# Patient Record
Sex: FEMALE | Race: WHITE | NOT HISPANIC OR LATINO | Employment: FULL TIME | URBAN - NONMETROPOLITAN AREA
[De-identification: names, ages, dates, MRNs, and addresses within clinical notes are randomized per-mention and may not be internally consistent; named-entity substitution may affect disease eponyms.]

---

## 2017-10-17 ENCOUNTER — OFFICE VISIT (OUTPATIENT)
Dept: FAMILY MEDICINE CLINIC | Facility: CLINIC | Age: 62
End: 2017-10-17

## 2017-10-17 ENCOUNTER — LAB (OUTPATIENT)
Dept: LAB | Facility: OTHER | Age: 62
End: 2017-10-17

## 2017-10-17 VITALS
DIASTOLIC BLOOD PRESSURE: 70 MMHG | HEIGHT: 60 IN | TEMPERATURE: 97.7 F | HEART RATE: 82 BPM | WEIGHT: 155.6 LBS | BODY MASS INDEX: 30.55 KG/M2 | SYSTOLIC BLOOD PRESSURE: 120 MMHG

## 2017-10-17 DIAGNOSIS — R42 DIZZY: ICD-10-CM

## 2017-10-17 DIAGNOSIS — R42 DIZZY: Primary | ICD-10-CM

## 2017-10-17 DIAGNOSIS — H65.03 BILATERAL ACUTE SEROUS OTITIS MEDIA, RECURRENCE NOT SPECIFIED: ICD-10-CM

## 2017-10-17 LAB
ALBUMIN SERPL-MCNC: 4.2 G/DL (ref 3.2–5.5)
ALBUMIN/GLOB SERPL: 1.4 G/DL (ref 1–3)
ALP SERPL-CCNC: 72 U/L (ref 15–121)
ALT SERPL W P-5'-P-CCNC: 15 U/L (ref 10–60)
ANION GAP SERPL CALCULATED.3IONS-SCNC: 10 MMOL/L (ref 5–15)
AST SERPL-CCNC: 16 U/L (ref 10–60)
BASOPHILS # BLD AUTO: 0.03 10*3/MM3 (ref 0–0.2)
BASOPHILS NFR BLD AUTO: 0.6 % (ref 0–2)
BILIRUB SERPL-MCNC: 0.8 MG/DL (ref 0.2–1)
BUN BLD-MCNC: 12 MG/DL (ref 8–25)
BUN/CREAT SERPL: 17.1 (ref 7–25)
CALCIUM SPEC-SCNC: 9.6 MG/DL (ref 8.4–10.8)
CHLORIDE SERPL-SCNC: 99 MMOL/L (ref 100–112)
CO2 SERPL-SCNC: 29 MMOL/L (ref 20–32)
CREAT BLD-MCNC: 0.7 MG/DL (ref 0.4–1.3)
DEPRECATED RDW RBC AUTO: 48.1 FL (ref 36.4–46.3)
EOSINOPHIL # BLD AUTO: 0.11 10*3/MM3 (ref 0–0.7)
EOSINOPHIL NFR BLD AUTO: 2.3 % (ref 0–7)
ERYTHROCYTE [DISTWIDTH] IN BLOOD BY AUTOMATED COUNT: 14.3 % (ref 11.5–14.5)
GFR SERPL CREATININE-BSD FRML MDRD: 86 ML/MIN/1.73 (ref 51–120)
GLOBULIN UR ELPH-MCNC: 3.1 GM/DL (ref 2.5–4.6)
GLUCOSE BLD-MCNC: 87 MG/DL (ref 70–100)
HCT VFR BLD AUTO: 41.4 % (ref 35–45)
HGB BLD-MCNC: 13.2 G/DL (ref 12–15.5)
LYMPHOCYTES # BLD AUTO: 1.18 10*3/MM3 (ref 0.6–4.2)
LYMPHOCYTES NFR BLD AUTO: 24.8 % (ref 10–50)
MCH RBC QN AUTO: 30.5 PG (ref 26.5–34)
MCHC RBC AUTO-ENTMCNC: 31.9 G/DL (ref 31.4–36)
MCV RBC AUTO: 95.6 FL (ref 80–98)
MONOCYTES # BLD AUTO: 0.44 10*3/MM3 (ref 0–0.9)
MONOCYTES NFR BLD AUTO: 9.3 % (ref 0–12)
NEUTROPHILS # BLD AUTO: 2.99 10*3/MM3 (ref 2–8.6)
NEUTROPHILS NFR BLD AUTO: 63 % (ref 37–80)
PLATELET # BLD AUTO: 181 10*3/MM3 (ref 150–450)
PMV BLD AUTO: 9.8 FL (ref 8–12)
POTASSIUM BLD-SCNC: 4.3 MMOL/L (ref 3.4–5.4)
PROT SERPL-MCNC: 7.3 G/DL (ref 6.7–8.2)
RBC # BLD AUTO: 4.33 10*6/MM3 (ref 3.77–5.16)
SODIUM BLD-SCNC: 138 MMOL/L (ref 134–146)
WBC NRBC COR # BLD: 4.75 10*3/MM3 (ref 3.2–9.8)

## 2017-10-17 PROCEDURE — 85025 COMPLETE CBC W/AUTO DIFF WBC: CPT | Performed by: NURSE PRACTITIONER

## 2017-10-17 PROCEDURE — 99214 OFFICE O/P EST MOD 30 MIN: CPT | Performed by: NURSE PRACTITIONER

## 2017-10-17 PROCEDURE — 82746 ASSAY OF FOLIC ACID SERUM: CPT | Performed by: NURSE PRACTITIONER

## 2017-10-17 PROCEDURE — 83540 ASSAY OF IRON: CPT | Performed by: NURSE PRACTITIONER

## 2017-10-17 PROCEDURE — 84443 ASSAY THYROID STIM HORMONE: CPT | Performed by: NURSE PRACTITIONER

## 2017-10-17 PROCEDURE — 82728 ASSAY OF FERRITIN: CPT | Performed by: NURSE PRACTITIONER

## 2017-10-17 PROCEDURE — 36415 COLL VENOUS BLD VENIPUNCTURE: CPT | Performed by: NURSE PRACTITIONER

## 2017-10-17 PROCEDURE — 82607 VITAMIN B-12: CPT | Performed by: NURSE PRACTITIONER

## 2017-10-17 PROCEDURE — 83550 IRON BINDING TEST: CPT | Performed by: NURSE PRACTITIONER

## 2017-10-17 PROCEDURE — 80053 COMPREHEN METABOLIC PANEL: CPT | Performed by: NURSE PRACTITIONER

## 2017-10-17 NOTE — PROGRESS NOTES
Subjective   Amanda Pittman is a 61 y.o. female. Patient here today with complaints of Balance Issues  Patient here today with  complaining of balance issues.  She reports being off balanced at times and really has no other complaints, no chest pain no shortness of breath no headache no sinus complaints.  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  This was performed in [place] by neurosurgeon approximately 2-3 years ago.  She denies having had any issues or problems since then, denies headache.    Vitals:    10/17/17 0843   BP: 120/70   Pulse: 82   Temp: 97.7 °F (36.5 °C)     Past Medical History:   Diagnosis Date   • Acute bronchitis    • Cellulitis     and abscess of face   • Dysphasia    • Herpes zoster    • Memory impairment      Dizziness   This is a new problem. The current episode started 1 to 4 weeks ago. The problem occurs constantly. The problem has been waxing and waning. Associated symptoms include vertigo. Pertinent negatives include no abdominal pain, arthralgias, change in bowel habit, chest pain, chills, congestion, coughing, diaphoresis, fever, headaches, joint swelling, myalgias, nausea, neck pain, numbness, rash, sore throat, swollen glands, urinary symptoms, vomiting or weakness. Nothing aggravates the symptoms. She has tried nothing for the symptoms. The treatment provided no relief.        The following portions of the patient's history were reviewed and updated as appropriate: allergies, current medications, past family history, past medical history, past social history, past surgical history and problem list.    Review of Systems   Constitutional: Negative.  Negative for chills, diaphoresis and fever.   HENT: Negative.  Negative for congestion and sore throat.    Eyes: Negative.    Respiratory: Negative.  Negative for cough.    Cardiovascular: Negative.  Negative for chest pain.   Gastrointestinal: Negative.  Negative for abdominal pain, change in bowel habit,  nausea and vomiting.   Endocrine: Negative.    Genitourinary: Negative.    Musculoskeletal: Negative.  Negative for arthralgias, joint swelling, myalgias and neck pain.   Skin: Negative.  Negative for rash.   Allergic/Immunologic: Negative.    Neurological: Positive for dizziness, vertigo and light-headedness. Negative for weakness, numbness and headaches.   Hematological: Negative.    Psychiatric/Behavioral: Negative.        Objective   Physical Exam   Constitutional: She is oriented to person, place, and time. She appears well-developed and well-nourished. No distress.   HENT:   Head: Normocephalic and atraumatic.   Right Ear: Hearing, external ear and ear canal normal. Tympanic membrane is bulging. A middle ear effusion is present.   Left Ear: Hearing, external ear and ear canal normal. Tympanic membrane is bulging. A middle ear effusion is present.   Nose: Right sinus exhibits no maxillary sinus tenderness and no frontal sinus tenderness. Left sinus exhibits no maxillary sinus tenderness and no frontal sinus tenderness.   Mouth/Throat: Uvula is midline, oropharynx is clear and moist and mucous membranes are normal. No tonsillar exudate.   Neck: Normal carotid pulses present. Carotid bruit is not present.   Cardiovascular: Normal rate, regular rhythm and normal heart sounds.  Exam reveals no gallop and no friction rub.    No murmur heard.  Pulmonary/Chest: Effort normal and breath sounds normal. No respiratory distress. She has no wheezes. She has no rales.   Musculoskeletal: She exhibits no edema.   Neurological: She is alert and oriented to person, place, and time. She has normal strength and normal reflexes. She displays normal reflexes. No cranial nerve deficit. She exhibits normal muscle tone. Coordination (positive Romberg, she is unable to walk heel to toe in exam room) abnormal.   Skin: Skin is warm and dry. No rash noted. She is not diaphoretic. No erythema. No pallor.   Psychiatric: She has a normal  mood and affect. Her speech is normal and behavior is normal. Judgment and thought content normal. She is not actively hallucinating. Cognition and memory are normal. She is attentive.   Nursing note and vitals reviewed.      Assessment/Plan   Amanda was seen today for balance issues.    Diagnoses and all orders for this visit:    Dizzy  -     CBC & Differential; Future  -     Comprehensive Metabolic Panel; Future  -     TSH; Future  -     CT Head With & Without Contrast  -     Vitamin B12; Future  -     Ferritin; Future  -     Folate; Future  -     Iron and TIBC; Future    Bilateral acute serous otitis media, recurrence not specified    Other orders  -     fluticasone (FLONASE) 50 MCG/ACT nasal spray; 2 sprays into each nostril Daily.  -     meclizine (ANTIVERT) 25 MG tablet; Take 1 tablet by mouth 3 (Three) Times a Day As Needed for dizziness.  -     predniSONE (DELTASONE) 20 MG tablet; Take 1 tablet by mouth 2 (Two) Times a Day for 5 days.    I will obtain above labs and order CT of head with and without contrast.  MRI cannot be obtained due to shunt.  She is informed of results and I will treat her with Flonase meclizine as above when necessary and prednisone as above.  However, they are advised if her symptoms should persist or worsen she needs to return here for follow-up and may need referral on to neurology if symptoms persist.  If symptoms resolve completely then she will follow-up here as scheduled for chronic conditions.  All questions and concerns are addressed with understanding noted.  She is aware and is in agreement to above plan.

## 2017-10-18 ENCOUNTER — TELEPHONE (OUTPATIENT)
Dept: FAMILY MEDICINE CLINIC | Facility: CLINIC | Age: 62
End: 2017-10-18

## 2017-10-18 DIAGNOSIS — J34.89 LESION OF NOSE: Primary | ICD-10-CM

## 2017-10-18 LAB
FERRITIN SERPL-MCNC: 53.1 NG/ML (ref 11.1–264)
FOLATE SERPL-MCNC: 19.3 NG/ML (ref 2.76–21)
IRON 24H UR-MRATE: 66 MCG/DL (ref 37–170)
IRON SATN MFR SERPL: 21 % (ref 15–50)
TIBC SERPL-MCNC: 310 MCG/DL (ref 265–497)
TSH SERPL DL<=0.05 MIU/L-ACNC: 0.98 MIU/ML (ref 0.46–4.68)
VIT B12 BLD-MCNC: 246 PG/ML (ref 239–931)

## 2017-10-18 RX ORDER — MECLIZINE HYDROCHLORIDE 25 MG/1
25 TABLET ORAL 3 TIMES DAILY PRN
Qty: 30 TABLET | Refills: 0 | Status: SHIPPED | OUTPATIENT
Start: 2017-10-18 | End: 2018-09-04

## 2017-10-18 RX ORDER — FLUTICASONE PROPIONATE 50 MCG
2 SPRAY, SUSPENSION (ML) NASAL DAILY
Qty: 1 BOTTLE | Refills: 0 | Status: SHIPPED | OUTPATIENT
Start: 2017-10-18 | End: 2018-08-20

## 2017-10-18 RX ORDER — PREDNISONE 20 MG/1
20 TABLET ORAL 2 TIMES DAILY
Qty: 10 TABLET | Refills: 0 | Status: SHIPPED | OUTPATIENT
Start: 2017-10-18 | End: 2017-10-23

## 2017-10-23 ENCOUNTER — CLINICAL SUPPORT (OUTPATIENT)
Dept: FAMILY MEDICINE CLINIC | Facility: CLINIC | Age: 62
End: 2017-10-23

## 2017-10-23 DIAGNOSIS — E53.8 B12 DEFICIENCY: Primary | ICD-10-CM

## 2017-10-23 PROCEDURE — 96372 THER/PROPH/DIAG INJ SC/IM: CPT | Performed by: NURSE PRACTITIONER

## 2017-10-30 ENCOUNTER — CLINICAL SUPPORT (OUTPATIENT)
Dept: FAMILY MEDICINE CLINIC | Facility: CLINIC | Age: 62
End: 2017-10-30

## 2017-10-30 DIAGNOSIS — E53.8 B12 DEFICIENCY: Primary | ICD-10-CM

## 2017-10-30 PROCEDURE — 96372 THER/PROPH/DIAG INJ SC/IM: CPT | Performed by: NURSE PRACTITIONER

## 2017-11-02 RX ORDER — CYANOCOBALAMIN 1000 UG/ML
1000 INJECTION, SOLUTION INTRAMUSCULAR; SUBCUTANEOUS WEEKLY
Status: SHIPPED | OUTPATIENT
Start: 2017-11-02 | End: 2017-11-30

## 2017-11-02 RX ADMIN — CYANOCOBALAMIN 1000 MCG: 1000 INJECTION, SOLUTION INTRAMUSCULAR; SUBCUTANEOUS at 10:10

## 2017-11-07 ENCOUNTER — CLINICAL SUPPORT (OUTPATIENT)
Dept: FAMILY MEDICINE CLINIC | Facility: CLINIC | Age: 62
End: 2017-11-07

## 2017-11-07 DIAGNOSIS — E53.8 B12 DEFICIENCY: Primary | ICD-10-CM

## 2017-11-07 PROCEDURE — 96372 THER/PROPH/DIAG INJ SC/IM: CPT | Performed by: NURSE PRACTITIONER

## 2017-11-13 RX ADMIN — CYANOCOBALAMIN 1000 MCG: 1000 INJECTION, SOLUTION INTRAMUSCULAR; SUBCUTANEOUS at 12:14

## 2017-11-14 ENCOUNTER — CLINICAL SUPPORT (OUTPATIENT)
Dept: FAMILY MEDICINE CLINIC | Facility: CLINIC | Age: 62
End: 2017-11-14

## 2017-11-14 DIAGNOSIS — E53.8 B12 DEFICIENCY: Primary | ICD-10-CM

## 2017-11-14 PROCEDURE — 96372 THER/PROPH/DIAG INJ SC/IM: CPT | Performed by: NURSE PRACTITIONER

## 2017-11-16 RX ADMIN — CYANOCOBALAMIN 1000 MCG: 1000 INJECTION, SOLUTION INTRAMUSCULAR; SUBCUTANEOUS at 10:24

## 2017-12-04 RX ORDER — CYANOCOBALAMIN 1000 UG/ML
1000 INJECTION, SOLUTION INTRAMUSCULAR; SUBCUTANEOUS
Status: DISCONTINUED | OUTPATIENT
Start: 2017-12-04 | End: 2018-08-20 | Stop reason: DRUGHIGH

## 2017-12-04 RX ADMIN — CYANOCOBALAMIN 1000 MCG: 1000 INJECTION, SOLUTION INTRAMUSCULAR; SUBCUTANEOUS at 10:51

## 2017-12-12 ENCOUNTER — CLINICAL SUPPORT (OUTPATIENT)
Dept: FAMILY MEDICINE CLINIC | Facility: CLINIC | Age: 62
End: 2017-12-12

## 2017-12-12 DIAGNOSIS — E53.8 B12 DEFICIENCY: Primary | ICD-10-CM

## 2017-12-12 PROCEDURE — 96372 THER/PROPH/DIAG INJ SC/IM: CPT | Performed by: NURSE PRACTITIONER

## 2018-01-02 RX ADMIN — CYANOCOBALAMIN 1000 MCG: 1000 INJECTION, SOLUTION INTRAMUSCULAR; SUBCUTANEOUS at 15:51

## 2018-01-23 ENCOUNTER — TRANSCRIBE ORDERS (OUTPATIENT)
Dept: LAB | Facility: OTHER | Age: 63
End: 2018-01-23

## 2018-01-23 ENCOUNTER — LAB (OUTPATIENT)
Dept: LAB | Facility: OTHER | Age: 63
End: 2018-01-23

## 2018-01-23 ENCOUNTER — CLINICAL SUPPORT (OUTPATIENT)
Dept: FAMILY MEDICINE CLINIC | Facility: CLINIC | Age: 63
End: 2018-01-23

## 2018-01-23 DIAGNOSIS — C01 PRIMARY SQUAMOUS CELL CARCINOMA OF BASE OF TONGUE (HCC): ICD-10-CM

## 2018-01-23 DIAGNOSIS — E53.8 B12 DEFICIENCY: Primary | ICD-10-CM

## 2018-01-23 DIAGNOSIS — C01 PRIMARY SQUAMOUS CELL CARCINOMA OF BASE OF TONGUE (HCC): Primary | ICD-10-CM

## 2018-01-23 LAB
APTT PPP: 34.3 SECONDS (ref 20–40.3)
DEPRECATED RDW RBC AUTO: 45.7 FL (ref 36.4–46.3)
EOSINOPHIL # BLD MANUAL: 0.1 10*3/MM3 (ref 0–0.7)
EOSINOPHIL NFR BLD MANUAL: 2 % (ref 0–7)
ERYTHROCYTE [DISTWIDTH] IN BLOOD BY AUTOMATED COUNT: 13.8 % (ref 11.5–14.5)
HCT VFR BLD AUTO: 37.6 % (ref 35–45)
HGB BLD-MCNC: 11.9 G/DL (ref 12–15.5)
INR PPP: 0.92 (ref 0.8–1.2)
LYMPHOCYTES # BLD MANUAL: 1.53 10*3/MM3 (ref 0.6–4.2)
LYMPHOCYTES NFR BLD MANUAL: 30 % (ref 10–50)
LYMPHOCYTES NFR BLD MANUAL: 8 % (ref 0–12)
MCH RBC QN AUTO: 29.5 PG (ref 26.5–34)
MCHC RBC AUTO-ENTMCNC: 31.6 G/DL (ref 31.4–36)
MCV RBC AUTO: 93.1 FL (ref 80–98)
MONOCYTES # BLD AUTO: 0.41 10*3/MM3 (ref 0–0.9)
NEUTROPHILS # BLD AUTO: 3.05 10*3/MM3 (ref 2–8.6)
NEUTROPHILS NFR BLD MANUAL: 59 % (ref 37–80)
NEUTS BAND NFR BLD MANUAL: 1 % (ref 0–5)
PLATELET # BLD AUTO: 284 10*3/MM3 (ref 150–450)
PMV BLD AUTO: 9 FL (ref 8–12)
PROTHROMBIN TIME: 12.2 SECONDS (ref 11.1–15.3)
RBC # BLD AUTO: 4.04 10*6/MM3 (ref 3.77–5.16)
RBC MORPH BLD: NORMAL
SMALL PLATELETS BLD QL SMEAR: ADEQUATE
WBC MORPH BLD: NORMAL
WBC NRBC COR # BLD: 5.09 10*3/MM3 (ref 3.2–9.8)

## 2018-01-23 PROCEDURE — 85730 THROMBOPLASTIN TIME PARTIAL: CPT | Performed by: OTOLARYNGOLOGY

## 2018-01-23 PROCEDURE — 85025 COMPLETE CBC W/AUTO DIFF WBC: CPT | Performed by: INTERNAL MEDICINE

## 2018-01-23 PROCEDURE — 36415 COLL VENOUS BLD VENIPUNCTURE: CPT | Performed by: INTERNAL MEDICINE

## 2018-01-23 PROCEDURE — 96372 THER/PROPH/DIAG INJ SC/IM: CPT | Performed by: NURSE PRACTITIONER

## 2018-01-23 PROCEDURE — 85610 PROTHROMBIN TIME: CPT | Performed by: INTERNAL MEDICINE

## 2018-01-31 RX ADMIN — CYANOCOBALAMIN 1000 MCG: 1000 INJECTION, SOLUTION INTRAMUSCULAR; SUBCUTANEOUS at 09:34

## 2018-08-20 ENCOUNTER — LAB (OUTPATIENT)
Dept: LAB | Facility: OTHER | Age: 63
End: 2018-08-20

## 2018-08-20 ENCOUNTER — OFFICE VISIT (OUTPATIENT)
Dept: FAMILY MEDICINE CLINIC | Facility: CLINIC | Age: 63
End: 2018-08-20

## 2018-08-20 VITALS
BODY MASS INDEX: 28.86 KG/M2 | TEMPERATURE: 97.6 F | DIASTOLIC BLOOD PRESSURE: 60 MMHG | HEART RATE: 62 BPM | HEIGHT: 60 IN | WEIGHT: 147 LBS | SYSTOLIC BLOOD PRESSURE: 112 MMHG

## 2018-08-20 DIAGNOSIS — R11.2 NON-INTRACTABLE VOMITING WITH NAUSEA, UNSPECIFIED VOMITING TYPE: ICD-10-CM

## 2018-08-20 DIAGNOSIS — R63.4 WEIGHT LOSS: ICD-10-CM

## 2018-08-20 DIAGNOSIS — R26.89 BALANCE PROBLEM: Primary | ICD-10-CM

## 2018-08-20 DIAGNOSIS — Z98.2 HISTORY OF BRAIN SHUNT: ICD-10-CM

## 2018-08-20 DIAGNOSIS — R53.83 FATIGUE, UNSPECIFIED TYPE: ICD-10-CM

## 2018-08-20 DIAGNOSIS — R26.89 BALANCE PROBLEM: ICD-10-CM

## 2018-08-20 LAB
ALBUMIN SERPL-MCNC: 4.5 G/DL (ref 3.5–5)
ALBUMIN/GLOB SERPL: 1.5 G/DL (ref 1.1–1.8)
ALP SERPL-CCNC: 88 U/L (ref 38–126)
ALT SERPL W P-5'-P-CCNC: 22 U/L
ANION GAP SERPL CALCULATED.3IONS-SCNC: 8 MMOL/L (ref 5–15)
AST SERPL-CCNC: 27 U/L (ref 14–36)
BASOPHILS # BLD AUTO: 0.01 10*3/MM3 (ref 0–0.2)
BASOPHILS NFR BLD AUTO: 0.2 % (ref 0–2)
BILIRUB SERPL-MCNC: 0.7 MG/DL (ref 0.2–1.3)
BUN BLD-MCNC: 12 MG/DL (ref 7–17)
BUN/CREAT SERPL: 18.8 (ref 7–25)
CALCIUM SPEC-SCNC: 9.8 MG/DL (ref 8.4–10.2)
CHLORIDE SERPL-SCNC: 99 MMOL/L (ref 98–107)
CO2 SERPL-SCNC: 28 MMOL/L (ref 22–30)
CREAT BLD-MCNC: 0.64 MG/DL (ref 0.52–1.04)
DEPRECATED RDW RBC AUTO: 48.8 FL (ref 36.4–46.3)
EOSINOPHIL # BLD AUTO: 0.09 10*3/MM3 (ref 0–0.7)
EOSINOPHIL NFR BLD AUTO: 2.2 % (ref 0–7)
ERYTHROCYTE [DISTWIDTH] IN BLOOD BY AUTOMATED COUNT: 14.9 % (ref 11.5–14.5)
FERRITIN SERPL-MCNC: 96.3 NG/ML (ref 11.1–264)
FOLATE SERPL-MCNC: 14.7 NG/ML (ref 2.76–21)
GFR SERPL CREATININE-BSD FRML MDRD: 95 ML/MIN/1.73 (ref 45–104)
GLOBULIN UR ELPH-MCNC: 3.1 GM/DL (ref 2.3–3.5)
GLUCOSE BLD-MCNC: 115 MG/DL (ref 74–99)
HCT VFR BLD AUTO: 43 % (ref 35–45)
HGB BLD-MCNC: 13.9 G/DL (ref 12–15.5)
IRON 24H UR-MRATE: 57 MCG/DL (ref 37–170)
IRON SATN MFR SERPL: 19 % (ref 15–50)
LYMPHOCYTES # BLD AUTO: 0.78 10*3/MM3 (ref 0.6–4.2)
LYMPHOCYTES NFR BLD AUTO: 19.3 % (ref 10–50)
MCH RBC QN AUTO: 30 PG (ref 26.5–34)
MCHC RBC AUTO-ENTMCNC: 32.3 G/DL (ref 31.4–36)
MCV RBC AUTO: 92.9 FL (ref 80–98)
MONOCYTES # BLD AUTO: 0.36 10*3/MM3 (ref 0–0.9)
MONOCYTES NFR BLD AUTO: 8.9 % (ref 0–12)
NEUTROPHILS # BLD AUTO: 2.8 10*3/MM3 (ref 2–8.6)
NEUTROPHILS NFR BLD AUTO: 69.4 % (ref 37–80)
PLATELET # BLD AUTO: 198 10*3/MM3 (ref 150–450)
PMV BLD AUTO: 10.1 FL (ref 8–12)
POTASSIUM BLD-SCNC: 4.7 MMOL/L (ref 3.4–5)
PROT SERPL-MCNC: 7.6 G/DL (ref 6.3–8.2)
RBC # BLD AUTO: 4.63 10*6/MM3 (ref 3.77–5.16)
SODIUM BLD-SCNC: 135 MMOL/L (ref 137–145)
T4 FREE SERPL-MCNC: 1.17 NG/DL (ref 0.78–2.19)
TIBC SERPL-MCNC: 305 MCG/DL (ref 265–497)
TSH SERPL DL<=0.05 MIU/L-ACNC: 1.01 MIU/ML (ref 0.46–4.68)
VIT B12 BLD-MCNC: 895 PG/ML (ref 239–931)
WBC NRBC COR # BLD: 4.04 10*3/MM3 (ref 3.2–9.8)

## 2018-08-20 PROCEDURE — 83550 IRON BINDING TEST: CPT | Performed by: NURSE PRACTITIONER

## 2018-08-20 PROCEDURE — 80053 COMPREHEN METABOLIC PANEL: CPT | Performed by: NURSE PRACTITIONER

## 2018-08-20 PROCEDURE — 84443 ASSAY THYROID STIM HORMONE: CPT | Performed by: NURSE PRACTITIONER

## 2018-08-20 PROCEDURE — 99214 OFFICE O/P EST MOD 30 MIN: CPT | Performed by: NURSE PRACTITIONER

## 2018-08-20 PROCEDURE — 85025 COMPLETE CBC W/AUTO DIFF WBC: CPT | Performed by: NURSE PRACTITIONER

## 2018-08-20 PROCEDURE — 84439 ASSAY OF FREE THYROXINE: CPT | Performed by: NURSE PRACTITIONER

## 2018-08-20 PROCEDURE — 82746 ASSAY OF FOLIC ACID SERUM: CPT | Performed by: NURSE PRACTITIONER

## 2018-08-20 PROCEDURE — 84481 FREE ASSAY (FT-3): CPT | Performed by: NURSE PRACTITIONER

## 2018-08-20 PROCEDURE — 82607 VITAMIN B-12: CPT | Performed by: NURSE PRACTITIONER

## 2018-08-20 PROCEDURE — 82728 ASSAY OF FERRITIN: CPT | Performed by: NURSE PRACTITIONER

## 2018-08-20 PROCEDURE — 83540 ASSAY OF IRON: CPT | Performed by: NURSE PRACTITIONER

## 2018-08-20 PROCEDURE — 36415 COLL VENOUS BLD VENIPUNCTURE: CPT | Performed by: NURSE PRACTITIONER

## 2018-08-20 NOTE — PROGRESS NOTES
"Subjective   Amanda Pittman is a 62 y.o. female. Patient here today with complaints of Balance Issues  Patient here today with  complaining of not feeling well ×8 months, worsening.  She has reports of feeling off balanced and inability to walk a straight line, inability to walk heel to toe for several months.  Reports has had neurosurgical procedure with shunt placement with Dr. Greer in [place] at the [facility] a few years ago.  At that time she was having memory problems however reports currently her memory is \"good\" does have headaches but states no change in her headaches, does report nausea and vomiting, weight loss, nausea and vomiting worse after she eats, denies abdominal pain.  Reports good vision.  Since last visit she has been seeing regarding skin cancers on her face, optometry has given her new glasses and she has started B12 injections however none of these have changed her symptoms which are worsening.  Also complaining of fatigue.  Works from 4 AM to 1 PM and does nap during the day, reports she has had this scheduled for the last year and denies any increased and fatigue. relates she cannot have MRI due to shunt. Did have CT head at last visit and these results are reviewed today. Negative for acute problems. States is \"cold all of the time\".     Vitals:    08/20/18 1345   BP: 112/60   Pulse: 62   Temp: 97.6 °F (36.4 °C)     Past Medical History:   Diagnosis Date   • Acute bronchitis    • Cellulitis     and abscess of face   • Dysphasia    • Herpes zoster    • Memory impairment      History of Present Illness     The following portions of the patient's history were reviewed and updated as appropriate: allergies, current medications, past family history, past medical history, past social history, past surgical history and problem list.    Review of Systems   Constitutional: Positive for appetite change.   HENT: Negative.    Eyes: Negative.    Respiratory: Negative.  "   Cardiovascular: Negative.    Gastrointestinal: Positive for nausea and vomiting.   Endocrine: Negative.    Genitourinary: Negative.    Musculoskeletal: Positive for gait problem.   Skin: Negative.    Allergic/Immunologic: Negative.    Neurological: Positive for dizziness and weakness.   Hematological: Negative.    Psychiatric/Behavioral: Negative.        Objective   Physical Exam   Constitutional: She is oriented to person, place, and time. She appears well-developed and well-nourished. No distress.   HENT:   Head: Normocephalic and atraumatic.   Right Ear: External ear normal.   Left Ear: External ear normal.   Nose: Nose normal.   Mouth/Throat: Oropharynx is clear and moist.   Neck: Neck supple. Normal carotid pulses present. Carotid bruit is not present.   Cardiovascular: Normal rate, regular rhythm and normal heart sounds.  Exam reveals no gallop and no friction rub.    No murmur heard.  Pulmonary/Chest: Effort normal and breath sounds normal. No respiratory distress. She has no wheezes. She has no rales.   Abdominal: Soft. Bowel sounds are normal. She exhibits no distension and no mass. There is no tenderness. There is no rebound and no guarding. No hernia.   Neurological: She is alert and oriented to person, place, and time. She has normal reflexes. She displays normal reflexes. No cranial nerve deficit or sensory deficit. She exhibits abnormal muscle tone. She displays no seizure activity. Coordination (pos rhomberg, unable to perform heel to toe test, unsteady gait) and gait abnormal.   Skin: Skin is warm and dry. No rash noted. She is not diaphoretic. No erythema. No pallor.   Psychiatric: She has a normal mood and affect. Her behavior is normal.   Nursing note and vitals reviewed.      Assessment/Plan   Amanda was seen today for balance issues.    Diagnoses and all orders for this visit:    Balance problem  -     TSH; Future  -     T3, free; Future  -     T4, free; Future  -     CBC & Differential;  Future  -     Comprehensive metabolic panel; Future  -     Vitamin B12; Future  -     Ferritin; Future  -     Folate; Future  -     Iron and TIBC; Future    Fatigue, unspecified type  -     TSH; Future  -     T3, free; Future  -     T4, free; Future  -     CBC & Differential; Future  -     Comprehensive metabolic panel; Future  -     Vitamin B12; Future  -     Ferritin; Future  -     Folate; Future  -     Iron and TIBC; Future    Weight loss  -     TSH; Future  -     T3, free; Future  -     T4, free; Future  -     CBC & Differential; Future  -     Comprehensive metabolic panel; Future  -     Vitamin B12; Future  -     Ferritin; Future  -     Folate; Future  -     Iron and TIBC; Future    Non-intractable vomiting with nausea, unspecified vomiting type  -     TSH; Future  -     T3, free; Future  -     T4, free; Future  -     CBC & Differential; Future  -     Comprehensive metabolic panel; Future  -     Vitamin B12; Future  -     Ferritin; Future  -     Folate; Future  -     Iron and TIBC; Future    History of brain shunt    referred back to neurosurg dr agrawal for further eval and treatment as he deems nec  Previous CT head results reviewed  Will obtain labs as above and inform of results  Advised against working , climbing, driving until seen and evaluated by neurosurg.   Work excuse can be given if needed  They are aware and are in agreement to this plan.  All questions and concerns are addressed with understanding noted.

## 2018-08-21 ENCOUNTER — TELEPHONE (OUTPATIENT)
Dept: FAMILY MEDICINE CLINIC | Facility: CLINIC | Age: 63
End: 2018-08-21

## 2018-08-21 DIAGNOSIS — R93.89 ABNORMAL MRI: Primary | ICD-10-CM

## 2018-08-21 NOTE — TELEPHONE ENCOUNTER
----- Message from YUMI Doss sent at 8/21/2018 10:23 AM CDT -----  Inform pt, did she get an appointment for neurosurg?

## 2018-08-22 LAB — T3FREE SERPL-MCNC: 2.2 PG/ML (ref 2–4.4)

## 2018-09-04 ENCOUNTER — TELEPHONE (OUTPATIENT)
Dept: FAMILY MEDICINE CLINIC | Facility: CLINIC | Age: 63
End: 2018-09-04

## 2018-09-04 ENCOUNTER — OFFICE VISIT (OUTPATIENT)
Dept: FAMILY MEDICINE CLINIC | Facility: CLINIC | Age: 63
End: 2018-09-04

## 2018-09-04 VITALS
DIASTOLIC BLOOD PRESSURE: 70 MMHG | BODY MASS INDEX: 28.86 KG/M2 | SYSTOLIC BLOOD PRESSURE: 112 MMHG | HEART RATE: 68 BPM | HEIGHT: 60 IN | TEMPERATURE: 97.5 F | OXYGEN SATURATION: 95 % | WEIGHT: 147 LBS

## 2018-09-04 DIAGNOSIS — R26.89 IMBALANCE: Primary | ICD-10-CM

## 2018-09-04 DIAGNOSIS — H92.03 OTALGIA OF BOTH EARS: ICD-10-CM

## 2018-09-04 PROCEDURE — 99213 OFFICE O/P EST LOW 20 MIN: CPT | Performed by: NURSE PRACTITIONER

## 2018-09-04 RX ORDER — MECLIZINE HYDROCHLORIDE 25 MG/1
25 TABLET ORAL 3 TIMES DAILY PRN
Qty: 30 TABLET | Refills: 0 | Status: SHIPPED | OUTPATIENT
Start: 2018-09-04 | End: 2019-01-07

## 2018-09-04 NOTE — PROGRESS NOTES
Subjective   Amanda Pittman is a 62 y.o. female. Patient here today with complaints of Follow-up and Balance Issues  Patient here today with  for recheck of feeling off balanced when walking, denies dizziness.  She has shunt and recently saw neurology for follow-up with new symptoms.  We are trying to obtain these records from Dr. Greer's office.  Her patient and  report given that there are no problems with her shunt and she was sent back here for reevaluation of imbalance.  Denies memory changes, denies headache.  Has not tried any medications for this in the past although has meclizine on med list states that she has not tried this in recent past.  Reports unsteady gait, unable to walk heel toe for the last several months, symptoms not necessarily worsening but persisting.  Denies chest pain or shortness of breath.    Vitals:    09/04/18 0917   BP: 112/70   Pulse: 68   Temp: 97.5 °F (36.4 °C)   SpO2: 95%     Past Medical History:   Diagnosis Date   • Acute bronchitis    • Cellulitis     and abscess of face   • Dysphasia    • Herpes zoster    • Memory impairment      History of Present Illness     The following portions of the patient's history were reviewed and updated as appropriate: allergies, current medications, past family history, past medical history, past social history, past surgical history and problem list.    Review of Systems   Constitutional: Negative.    HENT: Positive for ear pain.    Eyes: Negative.    Respiratory: Negative.    Cardiovascular: Negative.    Gastrointestinal: Negative.    Endocrine: Negative.    Genitourinary: Negative.    Musculoskeletal: Positive for gait problem.   Skin: Negative.    Allergic/Immunologic: Negative.    Neurological: Negative for dizziness (enies dizziness but does report imbalance and unsteady gait), tremors, seizures, syncope, facial asymmetry, speech difficulty, weakness, light-headedness, numbness and headaches.   Hematological: Negative.     Psychiatric/Behavioral: Negative.        Objective   Physical Exam   Constitutional: She is oriented to person, place, and time. She appears well-developed and well-nourished. No distress.   HENT:   Head: Normocephalic and atraumatic.   Right Ear: There is tenderness. Tympanic membrane is bulging.   Left Ear: There is tenderness.   Nose: Right sinus exhibits no maxillary sinus tenderness and no frontal sinus tenderness. Left sinus exhibits no maxillary sinus tenderness and no frontal sinus tenderness.   Mouth/Throat: Uvula is midline, oropharynx is clear and moist and mucous membranes are normal. No tonsillar exudate.   Neck: Normal carotid pulses present. Carotid bruit is not present.   Cardiovascular: Normal rate, regular rhythm and normal heart sounds.  Exam reveals no gallop and no friction rub.    No murmur heard.  Pulmonary/Chest: Effort normal and breath sounds normal. No respiratory distress. She has no wheezes. She has no rales.   Musculoskeletal: She exhibits no edema.   Neurological: She is alert and oriented to person, place, and time. She has normal reflexes. She is not disoriented. She displays no atrophy, no tremor and normal reflexes. No cranial nerve deficit or sensory deficit. She exhibits normal muscle tone. She displays no seizure activity. Coordination and gait abnormal.   Skin: Skin is warm and dry. No rash noted. She is not diaphoretic. No erythema. No pallor.   Psychiatric: She has a normal mood and affect. Her behavior is normal. Judgment and thought content normal.   Nursing note and vitals reviewed.      Assessment/Plan   Amanda was seen today for follow-up and balance issues.    Diagnoses and all orders for this visit:    Imbalance  -     XR Sinus [place] View Only    Otalgia of both ears  -     XR Sinus [place]mer[place] View Only    Other orders  -     meclizine (ANTIVERT) 25 MG tablet; Take 1 tablet by mouth 3 (Three) Times a Day As Needed for dizziness.    Labs are reviewed at today's visit,  copies give to pt if desired.   Will obtain sinus santos view xray  Will obtain note from neuro for review  Will start meclizine prn and flonase for symptom relief  Referred to ENT, dr mcdowell whom she has seen previously for skin cancer removal  Follow back up here in 1 month for recheck, sooner with problems  Advised no working, driving until symptoms improve  They are aware of this plan  All questions and concerns are addressed with understanding noted.

## 2018-09-06 ENCOUNTER — TELEPHONE (OUTPATIENT)
Dept: FAMILY MEDICINE CLINIC | Facility: CLINIC | Age: 63
End: 2018-09-06

## 2018-09-06 NOTE — TELEPHONE ENCOUNTER
----- Message from YUMI Doss sent at 9/6/2018  3:28 PM CDT -----  Inform pt, make sure she is seeing ENT in follow up, has already been referred back to dr mcdowell, fax copy of this report to him as well thanks

## 2018-11-19 ENCOUNTER — TELEPHONE (OUTPATIENT)
Dept: FAMILY MEDICINE CLINIC | Facility: CLINIC | Age: 63
End: 2018-11-19

## 2018-11-20 ENCOUNTER — LAB (OUTPATIENT)
Dept: LAB | Facility: OTHER | Age: 63
End: 2018-11-20

## 2018-11-20 ENCOUNTER — OFFICE VISIT (OUTPATIENT)
Dept: FAMILY MEDICINE CLINIC | Facility: CLINIC | Age: 63
End: 2018-11-20

## 2018-11-20 VITALS
DIASTOLIC BLOOD PRESSURE: 70 MMHG | WEIGHT: 135.4 LBS | OXYGEN SATURATION: 95 % | HEART RATE: 78 BPM | BODY MASS INDEX: 26.58 KG/M2 | HEIGHT: 60 IN | SYSTOLIC BLOOD PRESSURE: 90 MMHG | TEMPERATURE: 96.1 F

## 2018-11-20 DIAGNOSIS — R11.2 NAUSEA AND VOMITING, INTRACTABILITY OF VOMITING NOT SPECIFIED, UNSPECIFIED VOMITING TYPE: ICD-10-CM

## 2018-11-20 DIAGNOSIS — K21.9 GASTROESOPHAGEAL REFLUX DISEASE, ESOPHAGITIS PRESENCE NOT SPECIFIED: ICD-10-CM

## 2018-11-20 DIAGNOSIS — R26.81 UNSTEADY GAIT: ICD-10-CM

## 2018-11-20 DIAGNOSIS — R63.4 WEIGHT LOSS: ICD-10-CM

## 2018-11-20 DIAGNOSIS — R63.4 WEIGHT LOSS: Primary | ICD-10-CM

## 2018-11-20 DIAGNOSIS — Z12.39 BREAST SCREENING: ICD-10-CM

## 2018-11-20 LAB
ALBUMIN SERPL-MCNC: 4.6 G/DL (ref 3.5–5)
ALBUMIN/GLOB SERPL: 1.6 G/DL (ref 1.1–1.8)
ALP SERPL-CCNC: 112 U/L (ref 38–126)
ALT SERPL W P-5'-P-CCNC: 18 U/L
AMYLASE SERPL-CCNC: 110 U/L (ref 30–110)
ANION GAP SERPL CALCULATED.3IONS-SCNC: 9 MMOL/L (ref 5–15)
AST SERPL-CCNC: 24 U/L (ref 14–36)
BASOPHILS # BLD AUTO: 0.01 10*3/MM3 (ref 0–0.2)
BASOPHILS NFR BLD AUTO: 0.2 % (ref 0–2)
BILIRUB SERPL-MCNC: 0.7 MG/DL (ref 0.2–1.3)
BUN BLD-MCNC: 22 MG/DL (ref 7–17)
BUN/CREAT SERPL: 29.7 (ref 7–25)
CALCIUM SPEC-SCNC: 10.4 MG/DL (ref 8.4–10.2)
CHLORIDE SERPL-SCNC: 99 MMOL/L (ref 98–107)
CO2 SERPL-SCNC: 31 MMOL/L (ref 22–30)
CREAT BLD-MCNC: 0.74 MG/DL (ref 0.52–1.04)
DEPRECATED RDW RBC AUTO: 49.7 FL (ref 36.4–46.3)
EOSINOPHIL # BLD AUTO: 0.05 10*3/MM3 (ref 0–0.7)
EOSINOPHIL NFR BLD AUTO: 1 % (ref 0–7)
ERYTHROCYTE [DISTWIDTH] IN BLOOD BY AUTOMATED COUNT: 15.4 % (ref 11.5–14.5)
GFR SERPL CREATININE-BSD FRML MDRD: 80 ML/MIN/1.73 (ref 45–104)
GLOBULIN UR ELPH-MCNC: 2.8 GM/DL (ref 2.3–3.5)
GLUCOSE BLD-MCNC: 84 MG/DL (ref 74–99)
HCT VFR BLD AUTO: 44 % (ref 35–45)
HGB BLD-MCNC: 14.4 G/DL (ref 12–15.5)
LIPASE SERPL-CCNC: 122 U/L (ref 23–300)
LYMPHOCYTES # BLD AUTO: 0.64 10*3/MM3 (ref 0.6–4.2)
LYMPHOCYTES NFR BLD AUTO: 12.9 % (ref 10–50)
MCH RBC QN AUTO: 29.6 PG (ref 26.5–34)
MCHC RBC AUTO-ENTMCNC: 32.7 G/DL (ref 31.4–36)
MCV RBC AUTO: 90.3 FL (ref 80–98)
MONOCYTES # BLD AUTO: 0.47 10*3/MM3 (ref 0–0.9)
MONOCYTES NFR BLD AUTO: 9.4 % (ref 0–12)
NEUTROPHILS # BLD AUTO: 3.81 10*3/MM3 (ref 2–8.6)
NEUTROPHILS NFR BLD AUTO: 76.5 % (ref 37–80)
PLATELET # BLD AUTO: 175 10*3/MM3 (ref 150–450)
PMV BLD AUTO: 10.8 FL (ref 8–12)
POTASSIUM BLD-SCNC: 4.6 MMOL/L (ref 3.4–5)
PROT SERPL-MCNC: 7.4 G/DL (ref 6.3–8.2)
RBC # BLD AUTO: 4.87 10*6/MM3 (ref 3.77–5.16)
SODIUM BLD-SCNC: 139 MMOL/L (ref 137–145)
WBC NRBC COR # BLD: 4.98 10*3/MM3 (ref 3.2–9.8)

## 2018-11-20 PROCEDURE — 80053 COMPREHEN METABOLIC PANEL: CPT | Performed by: NURSE PRACTITIONER

## 2018-11-20 PROCEDURE — 86677 HELICOBACTER PYLORI ANTIBODY: CPT | Performed by: NURSE PRACTITIONER

## 2018-11-20 PROCEDURE — 85025 COMPLETE CBC W/AUTO DIFF WBC: CPT | Performed by: NURSE PRACTITIONER

## 2018-11-20 PROCEDURE — 86618 LYME DISEASE ANTIBODY: CPT | Performed by: NURSE PRACTITIONER

## 2018-11-20 PROCEDURE — 82150 ASSAY OF AMYLASE: CPT | Performed by: NURSE PRACTITIONER

## 2018-11-20 PROCEDURE — 99214 OFFICE O/P EST MOD 30 MIN: CPT | Performed by: NURSE PRACTITIONER

## 2018-11-20 PROCEDURE — 36415 COLL VENOUS BLD VENIPUNCTURE: CPT | Performed by: NURSE PRACTITIONER

## 2018-11-20 PROCEDURE — 83690 ASSAY OF LIPASE: CPT | Performed by: NURSE PRACTITIONER

## 2018-11-20 RX ORDER — FLUTICASONE PROPIONATE 50 MCG
1 SPRAY, SUSPENSION (ML) NASAL
COMMUNITY
End: 2019-01-07

## 2018-11-20 RX ORDER — IBUPROFEN 200 MG
400 TABLET ORAL
COMMUNITY
End: 2019-01-07

## 2018-11-21 DIAGNOSIS — Z12.11 ENCOUNTER FOR SCREENING COLONOSCOPY: Primary | ICD-10-CM

## 2018-11-22 LAB — B BURGDOR IGG+IGM SER-ACNC: <0.91 ISR (ref 0–0.9)

## 2018-11-23 LAB
H PYLORI IGA SER IA-ACNC: <9 UNITS (ref 0–8.9)
H PYLORI IGG SER IA-ACNC: 0.22 INDEX VALUE (ref 0–0.79)
H PYLORI IGM SER-ACNC: <9 UNITS (ref 0–8.9)

## 2018-11-29 ENCOUNTER — OFFICE VISIT (OUTPATIENT)
Dept: FAMILY MEDICINE CLINIC | Facility: CLINIC | Age: 63
End: 2018-11-29

## 2018-11-29 VITALS
BODY MASS INDEX: 26.58 KG/M2 | SYSTOLIC BLOOD PRESSURE: 88 MMHG | HEART RATE: 84 BPM | DIASTOLIC BLOOD PRESSURE: 62 MMHG | WEIGHT: 135.4 LBS | OXYGEN SATURATION: 98 % | HEIGHT: 60 IN

## 2018-11-29 DIAGNOSIS — R53.1 WEAKNESS: ICD-10-CM

## 2018-11-29 DIAGNOSIS — C64.9 RENAL CELL CARCINOMA, UNSPECIFIED LATERALITY (HCC): Primary | ICD-10-CM

## 2018-11-29 DIAGNOSIS — C64.1 RENAL CELL CARCINOMA OF RIGHT KIDNEY (HCC): Primary | ICD-10-CM

## 2018-11-29 PROCEDURE — 99213 OFFICE O/P EST LOW 20 MIN: CPT | Performed by: NURSE PRACTITIONER

## 2018-12-30 NOTE — PROGRESS NOTES
Subjective   Amanda Pittman is a 63 y.o. female. Patient here today with complaints of Follow-up (from [facility] Dr. Josemanuel Cain)  Patient here today with  for follow-up after seeing ENT.  Has been relates that symptoms persist or dizziness, off balanced feeling reports MRI negative per neurosurgery but symptoms are persisting and worsening.  Advised by ENT to come back here for specific Lyme disease testing.  Neurology patient saw was Dr. Greer in [place].  She also has history of GERD, recent weight loss and decreased appetite with nausea and vomiting persisting.  Denies any bowel changes.  Would like Lyme disease test results faxed to ENT.  Brother with diabetes, dad  of MI, mother OCD and emphysema .    Vitals:    18 1023   BP: 90/70   Pulse: 78   Temp: 96.1 °F (35.6 °C)   SpO2: 95%     Past Medical History:   Diagnosis Date   • Acute bronchitis    • Cellulitis     and abscess of face   • Dysphasia    • Herpes zoster    • Memory impairment      Weight Loss   This is a new problem. The current episode started more than 1 month ago. The problem occurs constantly. The problem has been gradually worsening. Associated symptoms include anorexia, fatigue, vertigo and weakness. Pertinent negatives include no chest pain, congestion, coughing or urinary symptoms. The symptoms are aggravated by bending, walking and standing. She has tried rest, sleep, relaxation and lying down for the symptoms. The treatment provided no relief.   Dizziness   This is a new problem. The current episode started more than 1 month ago. The problem occurs constantly. The problem has been gradually worsening. Associated symptoms include anorexia, fatigue, vertigo and weakness. Pertinent negatives include no chest pain, congestion, coughing or urinary symptoms. Nothing aggravates the symptoms. She has tried sleep, rest, relaxation, lying down and eating for the symptoms. The treatment provided no relief.        The  following portions of the patient's history were reviewed and updated as appropriate: allergies, current medications, past family history, past medical history, past social history, past surgical history and problem list.    Review of Systems   Constitutional: Positive for activity change, appetite change, fatigue, unexpected weight change and weight loss.   HENT: Negative.  Negative for congestion.    Eyes: Negative.    Respiratory: Negative.  Negative for cough.    Cardiovascular: Negative.  Negative for chest pain.   Gastrointestinal: Positive for anorexia.   Endocrine: Negative.    Genitourinary: Negative.    Musculoskeletal: Negative.    Skin: Negative.    Allergic/Immunologic: Negative.    Neurological: Positive for dizziness, vertigo and weakness.   Hematological: Negative.    Psychiatric/Behavioral: Negative.        Objective   Physical Exam   Constitutional: She is oriented to person, place, and time. She appears ill. No distress.   HENT:   Head: Normocephalic and atraumatic.   Cardiovascular: Normal rate, regular rhythm and normal heart sounds. Exam reveals no gallop and no friction rub.   No murmur heard.  Pulmonary/Chest: Effort normal and breath sounds normal. No stridor. No respiratory distress. She has no wheezes. She has no rales.   Abdominal: Soft. Bowel sounds are normal. She exhibits no distension and no mass. There is generalized tenderness. There is no rebound and no guarding. No hernia.   Neurological: She is alert and oriented to person, place, and time. She displays abnormal reflex. No cranial nerve deficit. Coordination (unable to walk heel to toe,positive Romberg) abnormal.   Skin: Skin is warm and dry. She is not diaphoretic. There is pallor.   Psychiatric: She has a normal mood and affect.   Nursing note and vitals reviewed.      Assessment/Plan   Amanda was seen today for follow-up.    Diagnoses and all orders for this visit:    Weight loss  -     Lyme, Total Antibody Test / Reflex  -      Mammo Screening Bilateral With CAD; Future  -     CT Abdomen Pelvis With Contrast  -     CBC & Differential; Future  -     Comprehensive metabolic panel; Future  -     Amylase; Future  -     Lipase; Future  -     Helicobacter Pylori, IgA IgG IgM; Future    Unsteady gait  -     Lyme, Total Antibody Test / Reflex  -     Mammo Screening Bilateral With CAD; Future  -     CT Abdomen Pelvis With Contrast  -     CBC & Differential; Future  -     Comprehensive metabolic panel; Future  -     Amylase; Future  -     Lipase; Future  -     Helicobacter Pylori, IgA IgG IgM; Future    Nausea and vomiting, intractability of vomiting not specified, unspecified vomiting type  -     Lyme, Total Antibody Test / Reflex  -     Mammo Screening Bilateral With CAD; Future  -     CT Abdomen Pelvis With Contrast  -     CBC & Differential; Future  -     Comprehensive metabolic panel; Future  -     Amylase; Future  -     Lipase; Future  -     Helicobacter Pylori, IgA IgG IgM; Future    Gastroesophageal reflux disease, esophagitis presence not specified  -     Lyme, Total Antibody Test / Reflex  -     Mammo Screening Bilateral With CAD; Future  -     CT Abdomen Pelvis With Contrast  -     CBC & Differential; Future  -     Comprehensive metabolic panel; Future  -     Amylase; Future  -     Lipase; Future  -     Helicobacter Pylori, IgA IgG IgM; Future    Breast screening  -     Mammo Screening Bilateral With CAD; Future    Mammogram ordered, labs ordered as above CT of abdomen and pelvis with contrast obtained, previous MRI reviewed as well as neurology's note.  Advised she will return to clinic with  neck week for all test results and may need referral on as indicated, depending on above test results.  They are aware and are in agreement to above plan, patient currently not working, also on sick leave.  Advised need to go to ER should symptoms worsen/ progress prior to next office visit here. All questions and concerns are addressed with  understanding noted.

## 2019-01-07 ENCOUNTER — OFFICE VISIT (OUTPATIENT)
Dept: FAMILY MEDICINE CLINIC | Facility: CLINIC | Age: 64
End: 2019-01-07

## 2019-01-07 VITALS
DIASTOLIC BLOOD PRESSURE: 68 MMHG | WEIGHT: 127 LBS | BODY MASS INDEX: 24.94 KG/M2 | SYSTOLIC BLOOD PRESSURE: 98 MMHG | HEART RATE: 81 BPM | HEIGHT: 60 IN

## 2019-01-07 DIAGNOSIS — R63.0 DECREASED APPETITE: ICD-10-CM

## 2019-01-07 DIAGNOSIS — R27.0 ATAXIA: ICD-10-CM

## 2019-01-07 DIAGNOSIS — R26.9 GAIT ABNORMALITY: ICD-10-CM

## 2019-01-07 DIAGNOSIS — R63.4 WEIGHT LOSS: ICD-10-CM

## 2019-01-07 DIAGNOSIS — C64.1 CARCINOMA OF RIGHT KIDNEY (HCC): Primary | ICD-10-CM

## 2019-01-07 DIAGNOSIS — R53.1 WEAKNESS: ICD-10-CM

## 2019-01-07 PROCEDURE — 99213 OFFICE O/P EST LOW 20 MIN: CPT | Performed by: NURSE PRACTITIONER

## 2019-01-07 RX ORDER — HYDROCODONE BITARTRATE AND ACETAMINOPHEN 7.5; 325 MG/1; MG/1
2 TABLET ORAL
COMMUNITY
Start: 2018-12-16 | End: 2019-05-14

## 2019-01-22 NOTE — PROGRESS NOTES
Subjective   Amanda Pittman is a 63 y.o. female. Patient here today with complaints of Follow-up  Patient here today with  for follow-up.  Still reporting balance is also and worse, is going back to see neurology, reports nausea and appetite some better but only minimally, still having lower abdominal pain, decreased endurance.  Has scheduled lumbar puncture on 2/18/19 at the [facility] in [place] with Dr. Dylan Pozo .  Patient was apparently referred to him for the lumbar puncture through her neurologist in [place].  Was recently diagnosed with renal carcinoma however all of her symptoms appear to be neurologic in nature.  Today she reports a questionable history of cervical cancer stating that she had a D&C, current biopsy several years ago.    Vitals:    01/07/19 1014   BP: 98/68   Pulse: 81     Past Medical History:   Diagnosis Date   • Acute bronchitis    • Cellulitis     and abscess of face   • Dysphasia    • Herpes zoster    • Memory impairment      History of Present Illness     The following portions of the patient's history were reviewed and updated as appropriate: allergies, current medications, past family history, past medical history, past social history, past surgical history and problem list.    Review of Systems   Constitutional: Positive for activity change, appetite change, fatigue and unexpected weight change.   Gastrointestinal: Positive for abdominal pain and nausea.   Musculoskeletal: Positive for gait problem.   Neurological: Positive for dizziness and weakness.       Objective   Physical Exam   Constitutional: She is oriented to person, place, and time.   HENT:   Head: Normocephalic and atraumatic.   Cardiovascular: Normal rate, regular rhythm and normal heart sounds. Exam reveals no gallop and no friction rub.   No murmur heard.  Pulmonary/Chest: Effort normal and breath sounds normal. No stridor. No respiratory distress. She has no wheezes. She has no rales.   Abdominal: Soft.  "Bowel sounds are normal. She exhibits no mass. There is generalized tenderness. There is no rebound and no guarding. No hernia.   Musculoskeletal: She exhibits no edema.   Neurological: She is alert and oriented to person, place, and time. She displays abnormal reflex. A sensory deficit is present. No cranial nerve deficit. She exhibits abnormal muscle tone. Coordination abnormal.   Skin: Skin is warm and dry. No rash noted. She is not diaphoretic. No erythema. There is pallor.   Psychiatric: She has a normal mood and affect.   Nursing note and vitals reviewed.      Assessment/Plan   Amanda was seen today for follow-up.    Diagnoses and all orders for this visit:    Carcinoma of right kidney (CMS/HCC)  Comments:  following up with dr martínez again end of Jan, 2019 for recheck    Weakness    Decreased appetite  Comments:  slightly improved today per pt report      Weight loss    Gait abnormality    Ataxia    Previous CT obtained here in the clinic reviewed again and reproductive organs unremarkable per radiologist   Patient is to follow-up with Dr. Martínez as scheduled for renal cancer and of January   Follow-up with neurology for lumbar puncture in February   Will need to obtain records if we do not receive them from neurology and all testing done after she has been in February   Patient's  also asked to have these faxed to us as well for review   Certainly should symptoms worsen she needs to go on to the emergency room and they are aware, patient currently not working   Continue to monitor her diet, weight and reports that she has gained a couple pounds over the last few weeks.    We will need to strongly consider pelvic ultrasound versus GYN referral if all above testing negative and symptoms persist to rule out ovarian cancer given the history that she gave me today of questionable \"female cancer\" in her past.    She needs to follow up here after seeing neurology   All questions and concerns are addressed " with understanding noted.   Records from dr martínez including path report reviewed today

## 2019-04-25 ENCOUNTER — TELEPHONE (OUTPATIENT)
Dept: FAMILY MEDICINE CLINIC | Facility: CLINIC | Age: 64
End: 2019-04-25

## 2019-04-29 ENCOUNTER — OFFICE VISIT (OUTPATIENT)
Dept: FAMILY MEDICINE CLINIC | Facility: CLINIC | Age: 64
End: 2019-04-29

## 2019-04-29 ENCOUNTER — LAB (OUTPATIENT)
Dept: LAB | Facility: OTHER | Age: 64
End: 2019-04-29

## 2019-04-29 VITALS
BODY MASS INDEX: 24.8 KG/M2 | TEMPERATURE: 97 F | DIASTOLIC BLOOD PRESSURE: 72 MMHG | SYSTOLIC BLOOD PRESSURE: 122 MMHG | HEIGHT: 60 IN | HEART RATE: 76 BPM

## 2019-04-29 DIAGNOSIS — Z82.0 FAMILY HISTORY OF MUSCULAR DYSTROPHY: ICD-10-CM

## 2019-04-29 DIAGNOSIS — G91.9 HYDROCEPHALUS (HCC): ICD-10-CM

## 2019-04-29 DIAGNOSIS — C64.1 RENAL CARCINOMA, RIGHT (HCC): ICD-10-CM

## 2019-04-29 DIAGNOSIS — Z90.5 STATUS POST NEPHRECTOMY: ICD-10-CM

## 2019-04-29 DIAGNOSIS — Z09 HOSPITAL DISCHARGE FOLLOW-UP: Primary | ICD-10-CM

## 2019-04-29 DIAGNOSIS — R53.1 WEAKNESS: ICD-10-CM

## 2019-04-29 DIAGNOSIS — R26.81 UNSTEADY GAIT: ICD-10-CM

## 2019-04-29 DIAGNOSIS — Z98.890 HISTORY OF BRAIN SURGERY: ICD-10-CM

## 2019-04-29 PROBLEM — N28.89 RENAL MASS: Status: ACTIVE | Noted: 2018-12-13

## 2019-04-29 LAB
ALBUMIN SERPL-MCNC: 4 G/DL (ref 3.5–5)
ALBUMIN/GLOB SERPL: 1.2 G/DL (ref 1.1–1.8)
ALP SERPL-CCNC: 288 U/L (ref 38–126)
ALT SERPL W P-5'-P-CCNC: 99 U/L
ANION GAP SERPL CALCULATED.3IONS-SCNC: 6 MMOL/L (ref 5–15)
AST SERPL-CCNC: 41 U/L (ref 14–36)
BASOPHILS # BLD AUTO: 0.03 10*3/MM3 (ref 0–0.2)
BASOPHILS NFR BLD AUTO: 0.6 % (ref 0–1.5)
BILIRUB SERPL-MCNC: 0.2 MG/DL (ref 0.2–1.3)
BUN BLD-MCNC: 24 MG/DL (ref 7–23)
BUN/CREAT SERPL: 35.8 (ref 7–25)
CALCIUM SPEC-SCNC: 10.1 MG/DL (ref 8.4–10.2)
CHLORIDE SERPL-SCNC: 96 MMOL/L (ref 101–112)
CO2 SERPL-SCNC: 35 MMOL/L (ref 22–30)
CREAT BLD-MCNC: 0.67 MG/DL (ref 0.52–1.04)
DEPRECATED RDW RBC AUTO: 55.6 FL (ref 37–54)
EOSINOPHIL # BLD AUTO: 0.19 10*3/MM3 (ref 0–0.4)
EOSINOPHIL NFR BLD AUTO: 3.7 % (ref 0.3–6.2)
ERYTHROCYTE [DISTWIDTH] IN BLOOD BY AUTOMATED COUNT: 16.7 % (ref 12.3–15.4)
GFR SERPL CREATININE-BSD FRML MDRD: 89 ML/MIN/1.73 (ref 45–104)
GLOBULIN UR ELPH-MCNC: 3.4 GM/DL (ref 2.3–3.5)
GLUCOSE BLD-MCNC: 81 MG/DL (ref 70–99)
HCT VFR BLD AUTO: 32.4 % (ref 34–46.6)
HGB BLD-MCNC: 10.3 G/DL (ref 12–15.9)
LYMPHOCYTES # BLD AUTO: 0.72 10*3/MM3 (ref 0.7–3.1)
LYMPHOCYTES NFR BLD AUTO: 13.9 % (ref 19.6–45.3)
MCH RBC QN AUTO: 30.2 PG (ref 26.6–33)
MCHC RBC AUTO-ENTMCNC: 31.8 G/DL (ref 31.5–35.7)
MCV RBC AUTO: 95 FL (ref 79–97)
MONOCYTES # BLD AUTO: 0.56 10*3/MM3 (ref 0.1–0.9)
MONOCYTES NFR BLD AUTO: 10.8 % (ref 5–12)
NEUTROPHILS # BLD AUTO: 3.67 10*3/MM3 (ref 1.7–7)
NEUTROPHILS NFR BLD AUTO: 71 % (ref 42.7–76)
PLATELET # BLD AUTO: 240 10*3/MM3 (ref 140–450)
PMV BLD AUTO: 10.1 FL (ref 6–12)
POTASSIUM BLD-SCNC: 4.7 MMOL/L (ref 3.4–5)
PROT SERPL-MCNC: 7.4 G/DL (ref 6.3–8.6)
RBC # BLD AUTO: 3.41 10*6/MM3 (ref 3.77–5.28)
SODIUM BLD-SCNC: 137 MMOL/L (ref 137–145)
WBC NRBC COR # BLD: 5.17 10*3/MM3 (ref 3.4–10.8)

## 2019-04-29 PROCEDURE — 85025 COMPLETE CBC W/AUTO DIFF WBC: CPT | Performed by: NURSE PRACTITIONER

## 2019-04-29 PROCEDURE — 82607 VITAMIN B-12: CPT | Performed by: NURSE PRACTITIONER

## 2019-04-29 PROCEDURE — 82306 VITAMIN D 25 HYDROXY: CPT | Performed by: NURSE PRACTITIONER

## 2019-04-29 PROCEDURE — 99214 OFFICE O/P EST MOD 30 MIN: CPT | Performed by: NURSE PRACTITIONER

## 2019-04-29 PROCEDURE — 80053 COMPREHEN METABOLIC PANEL: CPT | Performed by: NURSE PRACTITIONER

## 2019-04-29 PROCEDURE — 36415 COLL VENOUS BLD VENIPUNCTURE: CPT | Performed by: NURSE PRACTITIONER

## 2019-04-29 RX ORDER — METOCLOPRAMIDE 5 MG/1
TABLET ORAL
Refills: 0 | COMMUNITY
Start: 2019-04-23 | End: 2019-05-14

## 2019-04-29 RX ORDER — PROMETHAZINE HYDROCHLORIDE 12.5 MG/1
TABLET ORAL
Refills: 0 | COMMUNITY
Start: 2019-04-23 | End: 2019-05-14

## 2019-04-29 RX ORDER — PHENOL 1.4 %
AEROSOL, SPRAY (ML) MUCOUS MEMBRANE DAILY
Refills: 0 | COMMUNITY
Start: 2019-04-23 | End: 2019-05-14

## 2019-04-29 RX ORDER — HYDROCODONE BITARTRATE AND ACETAMINOPHEN 5; 325 MG/1; MG/1
TABLET ORAL
Refills: 0 | COMMUNITY
Start: 2019-04-23 | End: 2019-05-14

## 2019-04-29 RX ORDER — FOLIC ACID 1 MG/1
TABLET ORAL
Refills: 0 | COMMUNITY
Start: 2019-04-23 | End: 2019-05-14

## 2019-04-29 RX ORDER — MELATONIN
Refills: 0 | COMMUNITY
Start: 2019-04-23 | End: 2019-05-14

## 2019-04-29 RX ORDER — ESCITALOPRAM OXALATE 10 MG/1
TABLET ORAL
Refills: 0 | COMMUNITY
Start: 2019-04-23 | End: 2019-05-14

## 2019-04-29 RX ORDER — SUCRALFATE 1 G/1
TABLET ORAL
Refills: 0 | COMMUNITY
Start: 2019-04-23 | End: 2019-05-14

## 2019-04-29 RX ORDER — MIDODRINE HYDROCHLORIDE 5 MG/1
TABLET ORAL
Refills: 0 | COMMUNITY
Start: 2019-04-23 | End: 2019-05-14

## 2019-04-29 RX ORDER — LANOLIN ALCOHOL/MO/W.PET/CERES
CREAM (GRAM) TOPICAL
Refills: 0 | COMMUNITY
Start: 2019-04-23 | End: 2019-05-14

## 2019-04-29 RX ORDER — THIAMINE MONONITRATE (VIT B1) 100 MG
TABLET ORAL
Refills: 0 | COMMUNITY
Start: 2019-04-23 | End: 2019-05-14

## 2019-04-30 LAB
25(OH)D3 SERPL-MCNC: 33.8 NG/ML (ref 30–100)
VIT B12 BLD-MCNC: 1131 PG/ML (ref 211–946)

## 2019-04-30 NOTE — PROGRESS NOTES
"Subjective   Amanda Pittman is a 63 y.o. female. Patient here today with complaints of Follow-up  pt here today with  for hospital follow up. States pt had a scheduled appointment with neuro several weeks ago, became hypotensive, hypothermic and was sent to [facility] in [place], [place]. From there pt was transferred to [facility] where she was admitted to ICU x 4 weeks, initially dx with marie's dz but then after further testing the following dx were ruled out, according to , MS, [place]'s , Saracoid, Bone cancer, leukemia, MAC, [place], states after 4 weeks in ICU was transferred to regular floor x 1 week, transferred to [facility] x 5 weeks and was just discharged from there last week. She now has feeding tube, trying to supplement with regular food but still reports decreased appetite, does have home health currently for PT, OT, nursing. Has follow up appointment scheduled with neuro.  states he has contacted pt's insurance company who has \"grand rounds\" and has sent all of pt's records to [facility] for review.  would like PCP to be  for all records. Awaiting there review at present. Pt still reports weakness, however states she is able to ambulate with assist of walker, . Pt currently on long-term disability from work.  trying to continue to work. Pt has friends as sitters prn. Pt states is not dizzy and \"off-balanced\" feeling has improved although she still does have     Vitals:    04/29/19 0921   BP: 122/72   Pulse: 76   Temp: 97 °F (36.1 °C)     Past Medical History:   Diagnosis Date   • Acute bronchitis    • Cellulitis     and abscess of face   • Dysphasia    • Herpes zoster    • Memory impairment      History of Present Illness     The following portions of the patient's history were reviewed and updated as appropriate: allergies, current medications, past family history, past medical history, past social history, past surgical history " and problem list.    Review of Systems   Constitutional: Positive for activity change, appetite change and fatigue.   HENT: Negative.    Eyes: Negative.    Respiratory: Negative.    Cardiovascular: Negative.    Gastrointestinal: Negative.    Endocrine: Negative.    Genitourinary: Negative.    Musculoskeletal: Negative.    Skin: Positive for pallor (improved from last visit here).   Allergic/Immunologic: Negative.    Neurological: Positive for weakness.   Hematological: Negative.    Psychiatric/Behavioral: Negative.        Objective   Physical Exam   Constitutional: She is oriented to person, place, and time. She appears well-developed and well-nourished. No distress.   HENT:   Head: Normocephalic and atraumatic.   Mouth/Throat: Oropharynx is clear and moist.   Neck: Neck supple. Normal carotid pulses present. Carotid bruit is not present.   Cardiovascular: Normal rate, regular rhythm and normal heart sounds. Exam reveals no gallop and no friction rub.   No murmur heard.  Pulmonary/Chest: Effort normal and breath sounds normal. No stridor. No respiratory distress. She has no wheezes. She has no rales.   Abdominal: Soft. Bowel sounds are normal. She exhibits no distension and no mass. There is no tenderness. There is no guarding.   Feeding tube noted    Musculoskeletal:   Pt in wheelchair today, not ambulatory in office    Neurological: She is alert and oriented to person, place, and time.   Skin: Skin is warm and dry. No rash noted. She is not diaphoretic. No erythema. No pallor.   Psychiatric: She has a normal mood and affect. Her behavior is normal.   Nursing note and vitals reviewed.      Assessment/Plan   Amanda was seen today for follow-up.    Diagnoses and all orders for this visit:    Hospital discharge follow-up    Weakness  -     CBC & Differential; Future  -     Comprehensive metabolic panel; Future  -     Vitamin B12; Future  -     Vitamin D 25 hydroxy; Future    Renal carcinoma, right  (CMS/[facility])    Hydrocephalus    Family history of muscular dystrophy    Status post nephrectomy  Comments:  2-13-18 with dr arriaza      History of brain surgery  Comments:  shunt placed     Unsteady gait    records brought in with pt are reviewed however these are not her full records, they are also not under care everywhere  Will attempt to obtain full records from rehab and from hospitalization for further review  Will follow up with neuro as scheduled  RTC here for recheck in 1 month  Labs as above will be obtained, will inform via phone of results  Will await decesion/review from [facility] and if all negative will STRONGLY consider referral on to [facility]  Pt and  aware and are in agreement to this plan  All questions and concerns are addressed with understanding noted.

## 2019-05-14 ENCOUNTER — OFFICE VISIT (OUTPATIENT)
Dept: FAMILY MEDICINE CLINIC | Facility: CLINIC | Age: 64
End: 2019-05-14

## 2019-05-14 VITALS
HEIGHT: 60 IN | WEIGHT: 133.6 LBS | SYSTOLIC BLOOD PRESSURE: 112 MMHG | TEMPERATURE: 97.6 F | HEART RATE: 95 BPM | DIASTOLIC BLOOD PRESSURE: 58 MMHG | BODY MASS INDEX: 26.23 KG/M2

## 2019-05-14 DIAGNOSIS — R53.1 WEAKNESS: Primary | ICD-10-CM

## 2019-05-14 DIAGNOSIS — R26.81 UNSTEADY GAIT: ICD-10-CM

## 2019-05-14 DIAGNOSIS — D64.9 ANEMIA, UNSPECIFIED TYPE: ICD-10-CM

## 2019-05-14 DIAGNOSIS — G91.9 HYDROCEPHALUS (HCC): ICD-10-CM

## 2019-05-14 DIAGNOSIS — R79.9 ELEVATED BUN: ICD-10-CM

## 2019-05-14 DIAGNOSIS — R63.0 DECREASED APPETITE: ICD-10-CM

## 2019-05-14 DIAGNOSIS — R63.4 WEIGHT LOSS: ICD-10-CM

## 2019-05-14 PROCEDURE — 99213 OFFICE O/P EST LOW 20 MIN: CPT | Performed by: NURSE PRACTITIONER

## 2019-05-28 ENCOUNTER — OFFICE VISIT (OUTPATIENT)
Dept: FAMILY MEDICINE CLINIC | Facility: CLINIC | Age: 64
End: 2019-05-28

## 2019-05-28 VITALS
BODY MASS INDEX: 25.72 KG/M2 | TEMPERATURE: 97.2 F | DIASTOLIC BLOOD PRESSURE: 62 MMHG | HEART RATE: 76 BPM | WEIGHT: 131 LBS | HEIGHT: 60 IN | SYSTOLIC BLOOD PRESSURE: 118 MMHG

## 2019-05-28 DIAGNOSIS — C64.1 RENAL CARCINOMA, RIGHT (HCC): ICD-10-CM

## 2019-05-28 DIAGNOSIS — M62.81 MUSCLE WEAKNESS (GENERALIZED): Primary | ICD-10-CM

## 2019-05-28 DIAGNOSIS — R63.0 DECREASED APPETITE: ICD-10-CM

## 2019-05-28 DIAGNOSIS — G62.9 NEUROPATHY: ICD-10-CM

## 2019-05-28 PROBLEM — Z93.1 STATUS POST INSERTION OF PERCUTANEOUS ENDOSCOPIC GASTROSTOMY (PEG) TUBE (HCC): Status: ACTIVE | Noted: 2019-05-22

## 2019-05-28 PROCEDURE — 99214 OFFICE O/P EST MOD 30 MIN: CPT | Performed by: NURSE PRACTITIONER

## 2019-05-28 NOTE — PROGRESS NOTES
Subjective   Amanda Pittman is a 63 y.o. female. Patient here today with complaints of Follow-up and Weakness - Generalized  pt here today for recheck . She has a complicated hx over the last 6months to 1 year with developing weakness, nausea/vomiting, vertigo. Has been hospitalized where she was found to have a malfunction in her  shunt this was fixed and her symptoms continued. She had abnormal LP, she was hospitalized a few weeks ago in [place], MT'd with PT, OT and home health, [facility] OT has discharged her however still having PT, symptoms of weakness continuing although perhaps slightly improved, she has had [facility] through her insurance company, these findings are discussed with pt and  who also received a copy of report. Further eval with MRI brain with contrast, several labs were suggested as was referral to [facility] for repeat eval by neuromuscular specialist. This referral is pending, they are waiting call back from liason with her insurance company for referral to [facility]. We had discussed seeing [facility] at last visit however pt prefers [facility] due to closer proximity. Records from [facility] reviewed at length today. Pt did have feeding tube removed, states feels shaky at times which improves with eating. States is down 2 pounds from last visit here but states she is trying to eat, does not like boost but will drink milk shakes daily.     Vitals:    05/28/19 0816   BP: 118/62   Pulse: 76   Temp: 97.2 °F (36.2 °C)     Past Medical History:   Diagnosis Date   • Acute bronchitis    • Cellulitis     and abscess of face   • Dysphasia    • Herpes zoster    • Memory impairment      History of Present Illness     The following portions of the patient's history were reviewed and updated as appropriate: allergies, current medications, past family history, past medical history, past social history, past surgical history and problem list.    Review of Systems   Constitutional:  Positive for activity change, appetite change and fatigue.   HENT: Negative.    Eyes: Negative.    Respiratory: Negative.    Cardiovascular: Negative.    Gastrointestinal: Negative.    Endocrine: Negative.    Genitourinary: Negative.    Musculoskeletal: Positive for gait problem.   Skin: Negative.    Allergic/Immunologic: Negative.    Neurological: Positive for weakness.   Hematological: Negative.    Psychiatric/Behavioral: Negative.        Objective   Physical Exam   Constitutional: She is oriented to person, place, and time. She appears well-developed and well-nourished. No distress.   HENT:   Head: Normocephalic and atraumatic.   Cardiovascular: Normal rate, regular rhythm and normal heart sounds. Exam reveals no gallop and no friction rub.   No murmur heard.  Pulmonary/Chest: Effort normal and breath sounds normal. No stridor. No respiratory distress. She has no wheezes. She has no rales.   Neurological: She is alert and oriented to person, place, and time. She displays abnormal reflex. No cranial nerve deficit. She exhibits abnormal muscle tone. Coordination abnormal.   Reflex Scores:       Patellar reflexes are 1+ on the right side and 1+ on the left side.       Achilles reflexes are 0 on the right side and 0 on the left side.  Gait not evaluated today, pt relates unstable gait and does not have her walker with her today , BLE weakness noted on exam however she is able to move both lower extremites. She has equal and fairly good str to BUE. Radial pulses palp, in wheelchair today which is a regular finding for her in the last several months in office.    Skin: Skin is warm and dry. No rash noted. She is not diaphoretic. No erythema. There is pallor.   Psychiatric: She has a normal mood and affect. Her behavior is normal.   Nursing note and vitals reviewed.      Assessment/Plan   Amanda was seen today for follow-up and weakness - generalized.    Diagnoses and all orders for this visit:    Muscle weakness  (generalized)  Comments:  still has home health PT, reports slight improvement since starting PT  OT has been DC'd      Decreased appetite    Neuropathy    Renal carcinoma, right (CMS/HCC)  Comments:  follows up with dr arriaza, urology, [facility] regularly     records from [facility] reviewed with pt and   He will await call back regarding referral to neuromuscular specialist in [facility]  If does not receive call in the next week , will call back here for referral to be made  It was advised for pt to have MRI brain with contrast, labs including serum and urine protein elctrophoresis and immunofixation, free light chain ratio, RPR, ESR, CRP CBC with diff, cmp audie, A1C, copper, anti-RO/Lo, vitamin E, zinc, b6, ACE, cryoprotein, RF, anti-gliadin antibody, thiamine level, heavy metal screen, paraneoplastic antibody panel, repeat EMGs/NCS, LP. They are aware and are in agreement to this plan  Continue with home health PT  Advised STRONGLY to use walker and assistance at home to lower risk for falls  Pt aware  All questions and concerns are addressed with understanding noted.

## 2019-06-10 ENCOUNTER — OFFICE VISIT (OUTPATIENT)
Dept: FAMILY MEDICINE CLINIC | Facility: CLINIC | Age: 64
End: 2019-06-10

## 2019-06-10 ENCOUNTER — LAB (OUTPATIENT)
Dept: LAB | Facility: OTHER | Age: 64
End: 2019-06-10

## 2019-06-10 VITALS
TEMPERATURE: 93.3 F | BODY MASS INDEX: 25.29 KG/M2 | HEIGHT: 60 IN | HEART RATE: 63 BPM | OXYGEN SATURATION: 96 % | SYSTOLIC BLOOD PRESSURE: 98 MMHG | DIASTOLIC BLOOD PRESSURE: 58 MMHG | WEIGHT: 128.8 LBS

## 2019-06-10 DIAGNOSIS — R53.1 WEAKNESS: ICD-10-CM

## 2019-06-10 DIAGNOSIS — T68.XXXD HYPOTHERMIA, SUBSEQUENT ENCOUNTER: ICD-10-CM

## 2019-06-10 DIAGNOSIS — R53.1 WEAKNESS: Primary | ICD-10-CM

## 2019-06-10 DIAGNOSIS — R42 DIZZINESS: ICD-10-CM

## 2019-06-10 DIAGNOSIS — G98.8 NEUROLOGIC DISORDER: ICD-10-CM

## 2019-06-10 DIAGNOSIS — H53.2 DOUBLE VISION: ICD-10-CM

## 2019-06-10 LAB
ALBUMIN SERPL-MCNC: 3.9 G/DL (ref 3.5–5)
ALBUMIN/GLOB SERPL: 1.1 G/DL (ref 1.1–1.8)
ALP SERPL-CCNC: 157 U/L (ref 38–126)
ALT SERPL W P-5'-P-CCNC: 27 U/L
ANION GAP SERPL CALCULATED.3IONS-SCNC: 6 MMOL/L (ref 5–15)
AST SERPL-CCNC: 27 U/L (ref 14–36)
BASOPHILS # BLD AUTO: 0.01 10*3/MM3 (ref 0–0.2)
BASOPHILS NFR BLD AUTO: 0.5 % (ref 0–1.5)
BILIRUB SERPL-MCNC: 0.4 MG/DL (ref 0.2–1.3)
BUN BLD-MCNC: 10 MG/DL (ref 7–23)
BUN/CREAT SERPL: 13.2 (ref 7–25)
CALCIUM SPEC-SCNC: 10.3 MG/DL (ref 8.4–10.2)
CHLORIDE SERPL-SCNC: 94 MMOL/L (ref 101–112)
CO2 SERPL-SCNC: 32 MMOL/L (ref 22–30)
CREAT BLD-MCNC: 0.76 MG/DL (ref 0.52–1.04)
DEPRECATED RDW RBC AUTO: 51 FL (ref 37–54)
EOSINOPHIL # BLD AUTO: 0.04 10*3/MM3 (ref 0–0.4)
EOSINOPHIL NFR BLD AUTO: 1.9 % (ref 0.3–6.2)
ERYTHROCYTE [DISTWIDTH] IN BLOOD BY AUTOMATED COUNT: 16.3 % (ref 12.3–15.4)
GFR SERPL CREATININE-BSD FRML MDRD: 77 ML/MIN/1.73 (ref 45–104)
GLOBULIN UR ELPH-MCNC: 3.4 GM/DL (ref 2.3–3.5)
GLUCOSE BLD-MCNC: 70 MG/DL (ref 70–99)
HCT VFR BLD AUTO: 37.3 % (ref 34–46.6)
HGB BLD-MCNC: 12.2 G/DL (ref 12–15.9)
LYMPHOCYTES # BLD AUTO: 0.68 10*3/MM3 (ref 0.7–3.1)
LYMPHOCYTES NFR BLD AUTO: 32.7 % (ref 19.6–45.3)
MCH RBC QN AUTO: 29 PG (ref 26.6–33)
MCHC RBC AUTO-ENTMCNC: 32.7 G/DL (ref 31.5–35.7)
MCV RBC AUTO: 88.6 FL (ref 79–97)
MONOCYTES # BLD AUTO: 0.19 10*3/MM3 (ref 0.1–0.9)
MONOCYTES NFR BLD AUTO: 9.1 % (ref 5–12)
NEUTROPHILS # BLD AUTO: 1.16 10*3/MM3 (ref 1.7–7)
NEUTROPHILS NFR BLD AUTO: 55.8 % (ref 42.7–76)
PLATELET # BLD AUTO: 127 10*3/MM3 (ref 140–450)
PMV BLD AUTO: 10.6 FL (ref 6–12)
POTASSIUM BLD-SCNC: 5.2 MMOL/L (ref 3.4–5)
PROT SERPL-MCNC: 7.3 G/DL (ref 6.3–8.6)
RBC # BLD AUTO: 4.21 10*6/MM3 (ref 3.77–5.28)
SODIUM BLD-SCNC: 132 MMOL/L (ref 137–145)
WBC NRBC COR # BLD: 2.08 10*3/MM3 (ref 3.4–10.8)

## 2019-06-10 PROCEDURE — 85025 COMPLETE CBC W/AUTO DIFF WBC: CPT | Performed by: NURSE PRACTITIONER

## 2019-06-10 PROCEDURE — 36415 COLL VENOUS BLD VENIPUNCTURE: CPT | Performed by: NURSE PRACTITIONER

## 2019-06-10 PROCEDURE — 80053 COMPREHEN METABOLIC PANEL: CPT | Performed by: NURSE PRACTITIONER

## 2019-06-10 PROCEDURE — 99214 OFFICE O/P EST MOD 30 MIN: CPT | Performed by: NURSE PRACTITIONER

## 2019-06-12 RX ORDER — MEGESTROL ACETATE 40 MG/ML
400 SUSPENSION ORAL DAILY
Qty: 480 ML | Refills: 0 | Status: SHIPPED | OUTPATIENT
Start: 2019-06-12 | End: 2019-08-20

## 2019-06-17 ENCOUNTER — TELEPHONE (OUTPATIENT)
Dept: FAMILY MEDICINE CLINIC | Facility: CLINIC | Age: 64
End: 2019-06-17

## 2019-06-17 NOTE — TELEPHONE ENCOUNTER
Patient's  called to let us know he called [facility] and they changed her appointment to 7/2/19.  Patient was informed to go to er if she gets worse

## 2019-06-30 NOTE — PROGRESS NOTES
Subjective   Amanda Pittman is a 63 y.o. female. Patient here today with complaints of Follow-up and Uncontrollable Muscle Weakness  Patient here today with has been with a very complex history.  Over the last year she has had continued weight loss, increasing neurological symptoms of dizziness blurred vision, weakness, symptoms would wax and wane but overall worsening.  She has recently been noted to have hypothermia, frequent falls, they are awaiting referral/appointment to [facility].  Has already been seen by neurology services in [place], she was actually admitted to ICU a few months ago in [place], all testing imaging either negative or not conclusive of any final diagnosis.  She reports recently inability to eat again, her G-tube has been removed, she has lost another 3 pounds from last visit here approximately 2 weeks ago.    Vitals:    06/10/19 1053   BP: 98/58   Pulse: 63   Temp: 93.3 °F (34.1 °C)   SpO2: 96%     Past Medical History:   Diagnosis Date   • Acute bronchitis    • Cellulitis     and abscess of face   • Dysphasia    • Herpes zoster    • Memory impairment      Weakness - Generalized   This is a chronic problem. The current episode started more than 1 year ago. The problem occurs constantly. The problem has been rapidly worsening. Associated symptoms include anorexia, fatigue, myalgias, vertigo, a visual change and weakness. Fever: low temp over the last several weeks, off and on , continuing. The symptoms are aggravated by walking and standing. She has tried rest, sleep and relaxation for the symptoms. The treatment provided no relief.   Eye Problem    Both eyes are affected.This is a recurrent problem. The current episode started more than 1 month ago. The problem occurs intermittently. The problem has been waxing and waning. There was no injury mechanism. The patient is experiencing no pain. Associated symptoms include blurred vision, double vision and weakness. Fever: low temp over the  last several weeks, off and on , continuing. She has tried nothing for the symptoms. The treatment provided no relief.   Dizziness   This is a recurrent problem. The current episode started more than 1 year ago. The problem occurs intermittently. The problem has been waxing and waning. Associated symptoms include anorexia, fatigue, myalgias, vertigo, a visual change and weakness. Fever: low temp over the last several weeks, off and on , continuing. The symptoms are aggravated by standing and walking. She has tried rest, sleep and relaxation for the symptoms. The treatment provided no relief.        The following portions of the patient's history were reviewed and updated as appropriate: allergies, current medications, past family history, past medical history, past social history, past surgical history and problem list.    Review of Systems   Constitutional: Positive for activity change, appetite change, fatigue and unexpected weight change. Fever: low temp over the last several weeks, off and on , continuing.   HENT: Negative.    Eyes: Positive for blurred vision and double vision.   Respiratory: Negative.    Cardiovascular: Negative.    Gastrointestinal: Positive for anorexia.   Endocrine: Negative.    Genitourinary: Negative.    Musculoskeletal: Positive for myalgias.   Skin: Negative.    Allergic/Immunologic: Negative.    Neurological: Positive for dizziness, vertigo, weakness and light-headedness.   Hematological: Negative.    Psychiatric/Behavioral: Negative.        Objective   Physical Exam   Constitutional: She is oriented to person, place, and time. She appears lethargic. She is cooperative. She appears ill.   Cardiovascular: Normal rate and regular rhythm. Exam reveals no gallop and no friction rub.   No murmur heard.  Pulmonary/Chest: Effort normal and breath sounds normal. No stridor. No respiratory distress. She has no wheezes. She has no rales.   Neurological: She is oriented to person, place, and  time. She appears lethargic.   Skin: Skin is warm and dry. No rash noted. No erythema. There is pallor.   Psychiatric: She has a normal mood and affect.   Nursing note and vitals reviewed.      Assessment/Plan   Amanda was seen today for follow-up and uncontrollable muscle weakness.    Diagnoses and all orders for this visit:    Weakness  -     CBC & Differential; Future  -     Comprehensive metabolic panel; Future    Dizziness    Neurologic disorder    Double vision    Hypothermia, subsequent encounter    Condition very concerning, I did discuss her case with physician as well, I have reviewed most if not all of her previous testing including testing completed in [place], I have called myself to [facility] asking for an appointment and was given one in July, I did ask if this could be expedited, I spoke to neurology on the phone and he asked that neurologist from [place] call and speak to him regarding patient before he expedite appointment.  Family and patient are made aware of this and are asked to go to emergency room should symptoms worsen prior to follow-up here or appointment with [facility].  I think contacted neurology who saw her in [place] during admission to [facility] and he has agreed to contact the neurologist at [facility] to hopefully expedite appointment.  Family and patient again are made aware of this, certainly if symptoms continue to deteriorate she does not need to wait to be seen at [facility] and would advise ER at that time if necessary.  They are aware and in agreement to this plan.  All questions and concerns are addressed with understanding noted.  CBC and CMP are obtained as above, will inform of results via phone.

## 2019-07-15 ENCOUNTER — DOCUMENTATION (OUTPATIENT)
Dept: FAMILY MEDICINE CLINIC | Facility: CLINIC | Age: 64
End: 2019-07-15

## 2019-07-15 NOTE — PROGRESS NOTES
rec'd call from dr campbell at [facility] this afternoon updating on pt's care and condition. States was referred from [facility] with progressive BLE weakness, thinks inflammatory cause, did have elevated WBCs in CSF, was treated with plasamaphoresis x 5days, is seeing neuromuscular specialist in outpatient. States this already scheduled . She did not see neuromuscular spec while hospitalized however, was also pancytopenic, did consult hematology, did not ever have to receive transfusion, report was HBG always around 7 but HCT always >22. Was given Copper x 1 week, Zinc x 1 month and continues on B6. Will need to have these levels repeated in 1 month. Reports did have adrenal insufficiency. Started on hydrocortisone 20mg qam and 10mg qpm. Reports hypothermic while hospitalized. ID workup neg. Bone marrow biopsy pt had prior to hospitalization and this was not repeated while hospitalized per report. Did have PET CT due to hx of renal cancer. Did have BLL lung uptake, rad read as being infectious vs possibly neoplastic however was not treated for pneumonia due to no symptoms of cough or fever. Also did see heme but did not see oncology and did not have bronch while at [facility]. Pt being transferred today to [facility] in [place] for OT/PT. I am told she is bedbound at present, does have follow up neuro scheduled already. States still uncertain dx but favor inflammatory vs neoplastic process.     REPEAT LABS in 1 month, due mid August:   Copper, Zinc, B6

## 2019-08-20 ENCOUNTER — OFFICE VISIT (OUTPATIENT)
Dept: FAMILY MEDICINE CLINIC | Facility: CLINIC | Age: 64
End: 2019-08-20

## 2019-08-20 VITALS
SYSTOLIC BLOOD PRESSURE: 104 MMHG | BODY MASS INDEX: 24.15 KG/M2 | WEIGHT: 123 LBS | OXYGEN SATURATION: 98 % | HEART RATE: 68 BPM | TEMPERATURE: 96.2 F | HEIGHT: 60 IN | DIASTOLIC BLOOD PRESSURE: 72 MMHG

## 2019-08-20 DIAGNOSIS — T68.XXXS HYPOTHERMIA, SEQUELA: ICD-10-CM

## 2019-08-20 DIAGNOSIS — R53.1 WEAKNESS: ICD-10-CM

## 2019-08-20 DIAGNOSIS — D64.9 ANEMIA, UNSPECIFIED TYPE: ICD-10-CM

## 2019-08-20 DIAGNOSIS — G72.49 INFLAMMATORY AND IMMUNE MYOPATHIES: ICD-10-CM

## 2019-08-20 DIAGNOSIS — N28.9 RENAL INSUFFICIENCY: ICD-10-CM

## 2019-08-20 DIAGNOSIS — Z09 HOSPITAL DISCHARGE FOLLOW-UP: Primary | ICD-10-CM

## 2019-08-20 PROCEDURE — 99213 OFFICE O/P EST LOW 20 MIN: CPT | Performed by: NURSE PRACTITIONER

## 2019-09-17 ENCOUNTER — OFFICE VISIT (OUTPATIENT)
Dept: FAMILY MEDICINE CLINIC | Facility: CLINIC | Age: 64
End: 2019-09-17

## 2019-09-17 VITALS
BODY MASS INDEX: 26.39 KG/M2 | HEIGHT: 60 IN | HEART RATE: 70 BPM | TEMPERATURE: 96.6 F | WEIGHT: 134.4 LBS | DIASTOLIC BLOOD PRESSURE: 62 MMHG | SYSTOLIC BLOOD PRESSURE: 98 MMHG

## 2019-09-17 DIAGNOSIS — E43 SEVERE MALNUTRITION (HCC): ICD-10-CM

## 2019-09-17 DIAGNOSIS — E27.40 ADRENAL INSUFFICIENCY (HCC): ICD-10-CM

## 2019-09-17 DIAGNOSIS — R53.1 WEAKNESS: Primary | ICD-10-CM

## 2019-09-17 DIAGNOSIS — G62.9 AXONAL POLYNEUROPATHY: ICD-10-CM

## 2019-09-17 PROCEDURE — 99213 OFFICE O/P EST LOW 20 MIN: CPT | Performed by: NURSE PRACTITIONER

## 2019-09-17 RX ORDER — HYDROCHLOROTHIAZIDE 12.5 MG/1
12.5 CAPSULE, GELATIN COATED ORAL DAILY PRN
Qty: 30 CAPSULE | Refills: 1 | Status: SHIPPED | OUTPATIENT
Start: 2019-09-17 | End: 2019-10-31

## 2019-09-17 RX ORDER — IRON,CARBONYL/ASCORBIC ACID 100-250 MG
TABLET ORAL 2 TIMES DAILY
COMMUNITY
Start: 2019-08-12 | End: 2019-10-31

## 2019-09-18 PROBLEM — G62.9 AXONAL POLYNEUROPATHY: Status: ACTIVE | Noted: 2019-07-03

## 2019-09-18 PROBLEM — E83.42 HYPOMAGNESEMIA: Status: ACTIVE | Noted: 2019-06-24

## 2019-09-18 PROBLEM — E87.6 HYPOKALEMIA: Status: ACTIVE | Noted: 2019-07-05

## 2019-09-18 PROBLEM — E27.40 ADRENAL INSUFFICIENCY (HCC): Status: ACTIVE | Noted: 2019-07-01

## 2019-09-18 PROBLEM — R00.1 SINUS BRADYCARDIA: Status: ACTIVE | Noted: 2019-07-05

## 2019-09-18 PROBLEM — R26.2 INABILITY TO WALK: Status: ACTIVE | Noted: 2019-06-24

## 2019-09-18 PROBLEM — R31.9 HEMATURIA: Status: ACTIVE | Noted: 2019-07-01

## 2019-09-18 PROBLEM — E16.2 HYPOGLYCEMIA: Status: ACTIVE | Noted: 2019-07-05

## 2019-09-18 PROBLEM — K75.9 HEPATITIS: Status: ACTIVE | Noted: 2019-07-01

## 2019-09-18 PROBLEM — D61.818 PANCYTOPENIA (HCC): Status: ACTIVE | Noted: 2019-06-24

## 2019-09-18 PROBLEM — E43 SEVERE MALNUTRITION (HCC): Status: ACTIVE | Noted: 2019-07-01

## 2019-09-18 PROBLEM — R33.9 URINARY RETENTION: Status: ACTIVE | Noted: 2019-07-05

## 2019-09-18 PROBLEM — Z85.528 HISTORY OF RENAL CELL CARCINOMA: Status: ACTIVE | Noted: 2019-07-01

## 2019-09-18 PROBLEM — E86.0 LUETSCHER'S SYNDROME: Status: ACTIVE | Noted: 2019-06-24

## 2019-09-18 PROBLEM — G62.81 CRITICAL ILLNESS POLYNEUROPATHY (HCC): Status: ACTIVE | Noted: 2019-09-05

## 2019-09-18 PROBLEM — R53.1 WEAKNESS: Status: ACTIVE | Noted: 2019-06-24

## 2019-09-18 PROBLEM — R82.81 PYURIA: Status: ACTIVE | Noted: 2019-07-01

## 2019-09-18 PROBLEM — T68.XXXA HYPOTHERMIA: Status: ACTIVE | Noted: 2019-07-01

## 2019-09-18 NOTE — PROGRESS NOTES
Subjective   Amanda Pittman is a 63 y.o. female. Patient here today with complaints of Follow-up and Extremity Weakness  Patient here today with  for recheck of weakness, severe malnutrition, polyneuropathy.  She has seen neuromuscular specialist in [place] and follows back up with them on 12/12/2019.  Continues with home health for OT and PT.  Is eating better, appetite improved, taking vitamins as prescribed.  She is receiving home health services/therapy approximately 2 times per week.  She continues on iron with vitamin C twice daily and hydrocortisone 10 mg 3 times daily.  States she is doing much better than she was at last visit, reports increased strength, brings with her records from [facility] which will be scanned in chart.  Most recent labs which were 8 days ago revealed copper of 81.8, H&H 10.6 and 36 which are actually improved.  BUN was 22 and creatinine 0.8. She does report that she has been up on her feet more lately since her weakness has improved and now is having BLE swelling , worse in the evenings. Denies chest pain or shortness of breath.     Vitals:    09/17/19 0812   BP: 98/62   Pulse: 70   Temp: 96.6 °F (35.9 °C)     Past Medical History:   Diagnosis Date   • Acute bronchitis    • Cellulitis     and abscess of face   • Dysphasia    • Herpes zoster    • Memory impairment      Extremity Weakness    Pain location: generalized weakness. This is a chronic problem. The current episode started more than 1 year ago. The problem occurs constantly. The problem has been gradually improving. She has tried rest, acetaminophen and NSAIDS for the symptoms.        The following portions of the patient's history were reviewed and updated as appropriate: allergies, current medications, past family history, past medical history, past social history, past surgical history and problem list.    Review of Systems   Constitutional: Positive for activity change (increase in activity since last  visit here, able to walk more with walker and get around her house better without as much assist), appetite change (increase in appetite) and unexpected weight change (increase in weight of 11 pounds since last visit here, a positive finding for her).   HENT: Negative.    Eyes: Negative.    Respiratory: Negative.    Cardiovascular: Negative.    Gastrointestinal: Negative.    Endocrine: Negative.    Genitourinary: Negative.    Musculoskeletal: Positive for extremity weakness and gait problem.   Skin: Negative.    Allergic/Immunologic: Negative.    Neurological: Positive for weakness (improved from last visit here however).   Hematological: Negative.    Psychiatric/Behavioral: Negative.        Objective   Physical Exam   Constitutional: She is oriented to person, place, and time. She appears well-developed and well-nourished. No distress.   HENT:   Head: Normocephalic and atraumatic.   Cardiovascular: Normal rate, regular rhythm and normal heart sounds. Exam reveals no gallop and no friction rub.   No murmur heard.  Pulmonary/Chest: Effort normal and breath sounds normal. No stridor. No respiratory distress. She has no wheezes. She has no rales.   Musculoskeletal: She exhibits edema (1+ pitting edema noted BLE , mainly ankles and feet).   Pt in wheelchair but she has increased str to BUE and BLE as compared to last visit, not ambulatory here in office but states she is ambulatory with assist of walker at home   Neurological: She is alert and oriented to person, place, and time.   Skin: Skin is warm and dry. No rash noted. She is not diaphoretic. No erythema. There is pallor.   Psychiatric: She has a normal mood and affect. Her behavior is normal. Judgment and thought content normal.   Nursing note and vitals reviewed.      Assessment/Plan   Amanda was seen today for follow-up and extremity weakness.    Diagnoses and all orders for this visit:    Weakness    Axonal polyneuropathy    Severe malnutrition  (CMS/HCC)    Adrenal insufficiency (CMS/HCC)    Other orders  -     hydrochlorothiazide (MICROZIDE) 12.5 MG capsule; Take 1 capsule by mouth Daily As Needed (fluid retention).    Records and labs from [place] reviewed, scanned in chart   She started on HCTZ 12.5 mg as needed swelling as above, advised when she takes this to increase potassium in her diet   Since labs were recently drawn I will not draw them again today however I will have her follow-up in 3 months for recheck, sooner if needed, may consider repeating labs at that time if she has not had them elsewhere   Asked to return here after she sees neuromuscular specialist again in December   Suggested colonoscopy, mammogram, flu shot this fall   They are aware and are in agreement to this plan   all questions and concerns are addressed with understanding noted

## 2019-10-31 ENCOUNTER — LAB (OUTPATIENT)
Dept: LAB | Facility: OTHER | Age: 64
End: 2019-10-31

## 2019-10-31 ENCOUNTER — OFFICE VISIT (OUTPATIENT)
Dept: FAMILY MEDICINE CLINIC | Facility: CLINIC | Age: 64
End: 2019-10-31

## 2019-10-31 VITALS
OXYGEN SATURATION: 96 % | TEMPERATURE: 93.7 F | HEIGHT: 60 IN | DIASTOLIC BLOOD PRESSURE: 48 MMHG | BODY MASS INDEX: 25.52 KG/M2 | SYSTOLIC BLOOD PRESSURE: 92 MMHG | WEIGHT: 130 LBS | HEART RATE: 75 BPM

## 2019-10-31 DIAGNOSIS — E43 SEVERE MALNUTRITION (HCC): ICD-10-CM

## 2019-10-31 DIAGNOSIS — R53.1 WEAKNESS: ICD-10-CM

## 2019-10-31 DIAGNOSIS — D61.818 PANCYTOPENIA (HCC): ICD-10-CM

## 2019-10-31 DIAGNOSIS — R53.1 WEAKNESS: Primary | ICD-10-CM

## 2019-10-31 DIAGNOSIS — T68.XXXD HYPOTHERMIA, SUBSEQUENT ENCOUNTER: ICD-10-CM

## 2019-10-31 DIAGNOSIS — I95.9 HYPOTENSION, UNSPECIFIED HYPOTENSION TYPE: ICD-10-CM

## 2019-10-31 DIAGNOSIS — R63.0 DECREASED APPETITE: ICD-10-CM

## 2019-10-31 DIAGNOSIS — G62.9 AXONAL POLYNEUROPATHY: ICD-10-CM

## 2019-10-31 DIAGNOSIS — Z85.528 HISTORY OF RENAL CELL CARCINOMA: ICD-10-CM

## 2019-10-31 LAB
ALBUMIN SERPL-MCNC: 4.1 G/DL (ref 3.5–5)
ALBUMIN/GLOB SERPL: 1.4 G/DL (ref 1.1–1.8)
ALP SERPL-CCNC: 114 U/L (ref 38–126)
ALT SERPL W P-5'-P-CCNC: 17 U/L
ANION GAP SERPL CALCULATED.3IONS-SCNC: 4 MMOL/L (ref 5–15)
AST SERPL-CCNC: 22 U/L (ref 14–36)
BACTERIA UR QL AUTO: ABNORMAL /HPF
BASOPHILS # BLD AUTO: 0.01 10*3/MM3 (ref 0–0.2)
BASOPHILS NFR BLD AUTO: 0.3 % (ref 0–1.5)
BILIRUB SERPL-MCNC: 0.4 MG/DL (ref 0.2–1.3)
BILIRUB UR QL STRIP: NEGATIVE
BUN BLD-MCNC: 12 MG/DL (ref 7–23)
BUN/CREAT SERPL: 20 (ref 7–25)
CALCIUM SPEC-SCNC: 9.6 MG/DL (ref 8.4–10.2)
CHLORIDE SERPL-SCNC: 90 MMOL/L (ref 101–112)
CLARITY UR: ABNORMAL
CO2 SERPL-SCNC: 33 MMOL/L (ref 22–30)
COLOR UR: YELLOW
CREAT BLD-MCNC: 0.6 MG/DL (ref 0.52–1.04)
DEPRECATED RDW RBC AUTO: 46.6 FL (ref 37–54)
EOSINOPHIL # BLD AUTO: 0.04 10*3/MM3 (ref 0–0.4)
EOSINOPHIL NFR BLD AUTO: 1.1 % (ref 0.3–6.2)
ERYTHROCYTE [DISTWIDTH] IN BLOOD BY AUTOMATED COUNT: 14.9 % (ref 12.3–15.4)
GFR SERPL CREATININE-BSD FRML MDRD: 102 ML/MIN/1.73 (ref 45–104)
GLOBULIN UR ELPH-MCNC: 3 GM/DL (ref 2.3–3.5)
GLUCOSE BLD-MCNC: 70 MG/DL (ref 70–99)
GLUCOSE UR STRIP-MCNC: NEGATIVE MG/DL
HCT VFR BLD AUTO: 36.7 % (ref 34–46.6)
HGB BLD-MCNC: 12.2 G/DL (ref 12–15.9)
HGB UR QL STRIP.AUTO: NEGATIVE
HYALINE CASTS UR QL AUTO: ABNORMAL /LPF
KETONES UR QL STRIP: ABNORMAL
LEUKOCYTE ESTERASE UR QL STRIP.AUTO: ABNORMAL
LYMPHOCYTES # BLD AUTO: 0.89 10*3/MM3 (ref 0.7–3.1)
LYMPHOCYTES NFR BLD AUTO: 24.3 % (ref 19.6–45.3)
MCH RBC QN AUTO: 29.5 PG (ref 26.6–33)
MCHC RBC AUTO-ENTMCNC: 33.2 G/DL (ref 31.5–35.7)
MCV RBC AUTO: 88.6 FL (ref 79–97)
MONOCYTES # BLD AUTO: 0.4 10*3/MM3 (ref 0.1–0.9)
MONOCYTES NFR BLD AUTO: 10.9 % (ref 5–12)
NEUTROPHILS # BLD AUTO: 2.32 10*3/MM3 (ref 1.7–7)
NEUTROPHILS NFR BLD AUTO: 63.4 % (ref 42.7–76)
NITRITE UR QL STRIP: NEGATIVE
PH UR STRIP.AUTO: 5.5 [PH] (ref 5.5–8)
PLATELET # BLD AUTO: 143 10*3/MM3 (ref 140–450)
PMV BLD AUTO: 10.1 FL (ref 6–12)
POTASSIUM BLD-SCNC: 4.6 MMOL/L (ref 3.4–5)
PROT SERPL-MCNC: 7.1 G/DL (ref 6.3–8.6)
PROT UR QL STRIP: NEGATIVE
RBC # BLD AUTO: 4.14 10*6/MM3 (ref 3.77–5.28)
RBC # UR: ABNORMAL /HPF
REF LAB TEST METHOD: ABNORMAL
SODIUM BLD-SCNC: 127 MMOL/L (ref 137–145)
SP GR UR STRIP: 1.02 (ref 1–1.03)
SQUAMOUS #/AREA URNS HPF: ABNORMAL /HPF
UROBILINOGEN UR QL STRIP: ABNORMAL
WBC NRBC COR # BLD: 3.66 10*3/MM3 (ref 3.4–10.8)
WBC UR QL AUTO: ABNORMAL /HPF

## 2019-10-31 PROCEDURE — 81001 URINALYSIS AUTO W/SCOPE: CPT | Performed by: NURSE PRACTITIONER

## 2019-10-31 PROCEDURE — 36415 COLL VENOUS BLD VENIPUNCTURE: CPT | Performed by: NURSE PRACTITIONER

## 2019-10-31 PROCEDURE — 99214 OFFICE O/P EST MOD 30 MIN: CPT | Performed by: NURSE PRACTITIONER

## 2019-10-31 PROCEDURE — 87086 URINE CULTURE/COLONY COUNT: CPT | Performed by: NURSE PRACTITIONER

## 2019-10-31 PROCEDURE — 85025 COMPLETE CBC W/AUTO DIFF WBC: CPT | Performed by: NURSE PRACTITIONER

## 2019-10-31 PROCEDURE — 87077 CULTURE AEROBIC IDENTIFY: CPT | Performed by: NURSE PRACTITIONER

## 2019-10-31 PROCEDURE — 80053 COMPREHEN METABOLIC PANEL: CPT | Performed by: NURSE PRACTITIONER

## 2019-10-31 NOTE — PROGRESS NOTES
"Subjective   Amanda Pittman is a 63 y.o. female. Patient here today with complaints of Extremity Weakness  Patient here today with  for complaints of hypothermia again with weakness, decreased appetite, she has had an extensive last couple of years with generalized weakness, progressive, she has been in and out of [facility] and [facility] and has had quite an extensive work-up with final diagnosis being malnutrition, pancytopenia, polyneuropathy, hypothermia, hypokalemia, hypoglycemia and there has been some question about decreased copper however patient's  states that when she last saw neurology at [facility] in [place] approximately 6 weeks ago her copper level was within normal limits although in the lower normal range, 81.8, following dr burciaga at [place]. Has appointment to see neuro again 12-12-19. They both state that she had been doing very well, standing on her own and even cooking at home in the last 1-2 weeks however in the last week she has begun to decline again and is now to the point that she is unable to stand/walk on her own, her appetite has decreased again, her energy is low and her temp is low once again. No longer has home health services in the home for assist.     Vitals:    10/31/19 1008   BP: 92/48   Pulse: 75   Temp: 93.7 °F (34.3 °C)   SpO2: 96%   Weight: 59 kg (130 lb)  Comment: unable to weigh   Height: 152.4 cm (60\")   PainSc: 0-No pain     Body mass index is 25.39 kg/m².  Past Medical History:   Diagnosis Date   • Acute bronchitis    • Cellulitis     and abscess of face   • Dysphasia    • Herpes zoster    • Memory impairment      Extremity Weakness    This is a recurrent problem. The current episode started more than 1 year ago. The problem occurs constantly. The problem has been waxing and waning.        The following portions of the patient's history were reviewed and updated as appropriate: allergies, current medications, past family history, " past medical history, past social history, past surgical history and problem list.    Review of Systems   Constitutional: Positive for activity change, appetite change and unexpected weight change (down 4 pounds since last visit here).   Musculoskeletal: Positive for extremity weakness and gait problem.   Skin: Positive for color change and pallor.   Neurological: Positive for weakness.       Objective   Physical Exam   Constitutional: She is oriented to person, place, and time.   HENT:   Head: Normocephalic and atraumatic.   Cardiovascular: Normal rate, regular rhythm and normal heart sounds. Exam reveals no gallop and no friction rub.   No murmur heard.  Pulmonary/Chest: Effort normal and breath sounds normal. No stridor. No respiratory distress. She has no wheezes. She has no rales.   Musculoskeletal:   Pt in wheelchair today, able to stand with assist to weigh however    Neurological: She is alert and oriented to person, place, and time.   Skin: Skin is warm and dry. No rash noted. No erythema. There is pallor.   Psychiatric: She has a normal mood and affect. Her behavior is normal. Judgment and thought content normal.   Nursing note and vitals reviewed.      Assessment/Plan   Amanda was seen today for extremity weakness.    Diagnoses and all orders for this visit:    Weakness  -     CBC & Differential; Future  -     Comprehensive metabolic panel; Future  -     XR Chest 2 View  -     Urinalysis With Culture If Indicated -; Future    Severe malnutrition (CMS/HCC)    Axonal polyneuropathy    History of renal cell carcinoma    Pancytopenia (CMS/HCC)    Hypotension, unspecified hypotension type    Decreased appetite    Hypothermia, subsequent encounter    Most recent records from neurology, from [facility], reviewed including labs and these were drawn on 9/5/2019   I will repeat CBC CMP and obtain chest x-ray and urinalysis as above   I will await these results prior to treating or referring patient back to neurology,  may need to call Joseph and inform of patient's condition tomorrow depending again on her symptoms and lab results   Can also consider restarting home health for strengthening and weight/nutrition eval   They are aware and are in agreement to this plan   all questions and concerns are addressed with understanding noted.

## 2019-11-01 RX ORDER — SULFAMETHOXAZOLE AND TRIMETHOPRIM 800; 160 MG/1; MG/1
1 TABLET ORAL 2 TIMES DAILY
Qty: 14 TABLET | Refills: 0 | Status: SHIPPED | OUTPATIENT
Start: 2019-11-01 | End: 2019-11-08

## 2019-11-02 LAB — BACTERIA SPEC AEROBE CULT: NORMAL

## 2019-11-07 DIAGNOSIS — R30.0 DYSURIA: Primary | ICD-10-CM

## 2019-11-08 ENCOUNTER — LAB (OUTPATIENT)
Dept: LAB | Facility: OTHER | Age: 64
End: 2019-11-08

## 2019-11-08 DIAGNOSIS — R30.0 DYSURIA: ICD-10-CM

## 2019-11-08 PROCEDURE — 87086 URINE CULTURE/COLONY COUNT: CPT | Performed by: NURSE PRACTITIONER

## 2019-11-10 LAB — BACTERIA SPEC AEROBE CULT: NO GROWTH

## 2020-01-02 DIAGNOSIS — R27.0 ATAXIA: Primary | ICD-10-CM

## 2020-01-07 ENCOUNTER — HOSPITAL ENCOUNTER (OUTPATIENT)
Dept: MRI IMAGING | Facility: HOSPITAL | Age: 65
Discharge: HOME | End: 2020-01-07
Admitting: NURSE PRACTITIONER

## 2020-01-07 DIAGNOSIS — R27.0 ATAXIA: ICD-10-CM

## 2020-01-07 PROCEDURE — 25010000002 GADOTERIDOL PER 1 ML: Performed by: NURSE PRACTITIONER

## 2020-01-07 PROCEDURE — A9576 INJ PROHANCE MULTIPACK: HCPCS | Performed by: NURSE PRACTITIONER

## 2020-01-07 PROCEDURE — 70553 MRI BRAIN STEM W/O & W/DYE: CPT

## 2020-01-07 RX ADMIN — GADOTERIDOL 15 ML: 279.3 INJECTION, SOLUTION INTRAVENOUS at 18:35

## 2020-02-18 ENCOUNTER — OFFICE VISIT (OUTPATIENT)
Dept: FAMILY MEDICINE CLINIC | Facility: CLINIC | Age: 65
End: 2020-02-18

## 2020-02-18 VITALS
OXYGEN SATURATION: 98 % | HEIGHT: 60 IN | DIASTOLIC BLOOD PRESSURE: 68 MMHG | WEIGHT: 143.6 LBS | SYSTOLIC BLOOD PRESSURE: 110 MMHG | HEART RATE: 82 BPM | BODY MASS INDEX: 28.19 KG/M2 | TEMPERATURE: 96.7 F

## 2020-02-18 DIAGNOSIS — Z85.528 HISTORY OF RENAL CELL CANCER: ICD-10-CM

## 2020-02-18 DIAGNOSIS — M79.89 LEG SWELLING: Primary | ICD-10-CM

## 2020-02-18 DIAGNOSIS — R90.89 LEPTOMENINGEAL ENHANCEMENT ON MRI OF BRAIN: ICD-10-CM

## 2020-02-18 DIAGNOSIS — M62.81 MUSCLE WEAKNESS: ICD-10-CM

## 2020-02-18 PROCEDURE — 99214 OFFICE O/P EST MOD 30 MIN: CPT | Performed by: NURSE PRACTITIONER

## 2020-02-18 RX ORDER — PREDNISONE 50 MG/1
50 TABLET ORAL DAILY
COMMUNITY
Start: 2020-01-30

## 2020-02-18 RX ORDER — SULFAMETHOXAZOLE AND TRIMETHOPRIM 400; 80 MG/1; MG/1
1 TABLET ORAL DAILY
COMMUNITY
Start: 2020-01-30

## 2020-02-18 RX ORDER — FUROSEMIDE 20 MG/1
20 TABLET ORAL DAILY
Qty: 30 TABLET | Refills: 0 | Status: SHIPPED | OUTPATIENT
Start: 2020-02-18 | End: 2020-02-19

## 2020-02-18 RX ORDER — MIDODRINE HYDROCHLORIDE 10 MG/1
TABLET ORAL
COMMUNITY
Start: 2019-12-12

## 2020-02-19 RX ORDER — FUROSEMIDE 20 MG/1
20 TABLET ORAL DAILY
Qty: 90 TABLET | Refills: 0 | Status: SHIPPED | OUTPATIENT
Start: 2020-02-19 | End: 2020-05-12 | Stop reason: DRUGHIGH

## 2020-02-20 PROBLEM — G04.91 MYELITIS (HCC): Status: ACTIVE | Noted: 2019-12-13

## 2020-02-20 PROBLEM — G62.9 POLYNEUROPATHY: Status: ACTIVE | Noted: 2019-12-10

## 2020-02-20 PROBLEM — E87.1 HYPONATREMIA: Status: ACTIVE | Noted: 2019-11-10

## 2020-02-20 PROBLEM — Z03.89 ENCOUNTER FOR OBSERVATION FOR OTHER SUSPECTED DISEASES AND CONDITIONS RULED OUT: Status: ACTIVE | Noted: 2019-12-13

## 2020-02-20 NOTE — PROGRESS NOTES
"Subjective   Amanda Pittman is a 64 y.o. female. 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 She does have follow up scheduled, she is continuing to have some weakness although improved and is in need of home health again. She also reports increased appetite. She is here with complaints of BLE edema. denies shortness of breath or chest pain. They have tried SVETA hose, massage, apple cidar vinegar without symptom relief.     Vitals:    02/18/20 1045   BP: 110/68   Pulse: 82   Temp: 96.7 °F (35.9 °C)   SpO2: 98%   Weight: 65.1 kg (143 lb 9.6 oz)   Height: 152.4 cm (60\")   PainSc: 0-No pain     Body mass index is 28.04 kg/m².  Past Medical History:   Diagnosis Date   • Acute bronchitis    • Cellulitis     and abscess of face   • Dysphasia    • Herpes zoster    • Memory impairment      Leg Swelling   This is a new problem. The current episode started 1 to 4 weeks ago. The problem occurs constantly. The problem has been unchanged. Associated symptoms include arthralgias and myalgias. Exacerbated by: prolonged sitting, standing  She has tried sleep, rest and relaxation (elevation of BLE ) for the symptoms. The treatment provided no relief.   Extremity Weakness    This is a chronic problem. The current episode started more than 1 year ago. The problem occurs constantly. The problem has been waxing and " waning. The pain is moderate. She has tried rest for the symptoms. The treatment provided mild relief.        The following portions of the patient's history were reviewed and updated as appropriate: allergies, current medications, past family history, past medical history, past social history, past surgical history and problem list.    Review of Systems   Constitutional: Positive for activity change, appetite change and unexpected weight change.   HENT: Negative.    Eyes: Negative.    Respiratory: Negative.    Cardiovascular: Positive for leg swelling.   Gastrointestinal: Negative.    Endocrine: Negative.    Genitourinary: Negative.    Musculoskeletal: Positive for arthralgias, extremity weakness, gait problem and myalgias.   Skin: Negative.    Allergic/Immunologic: Negative.    Hematological: Negative.    Psychiatric/Behavioral: Negative.        Objective   Physical Exam   Constitutional: She is oriented to person, place, and time. She appears well-developed and well-nourished. No distress.   HENT:   Head: Normocephalic and atraumatic.   Cardiovascular: Normal rate, regular rhythm and normal heart sounds. Exam reveals no gallop and no friction rub.   No murmur heard.  Pulmonary/Chest: Effort normal and breath sounds normal. No stridor. No respiratory distress. She has no wheezes. She has no rales.   Musculoskeletal: She exhibits edema.   She has 2-3+ pitting edema noted BLE, BLE are weak however not as weak as I have seen her in the past. She is able to raise both LE on her own to some degree. She in able to stand to weigh today, she is not ambulatory in office today, in wheelchair but states is ambulatory with walker in her home    Neurological: She is alert and oriented to person, place, and time.   Skin: Skin is warm and dry. No rash noted. She is not diaphoretic. No erythema. No pallor.   Psychiatric: She has a normal mood and affect. Her behavior is normal.   Nursing note and vitals  reviewed.      Assessment/Plan   Amanda was seen today for leg pain.    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    Records from [facility] are reviewed in detail, she will follow-up with them as scheduled, advised continuing SVETA hose, she is prescribed Lasix as above to take daily and when taking this advised to increase potassium in diet.  She will be encouraged to take Lasix daily for the next week and then as needed.  [facility] home health agency is contacted and asked to evaluate her for services especially physical therapy for strengthening.  Patient has been aware and in agreement to this plan.  All questions and concerns are addressed with understanding noted.

## 2020-02-25 DIAGNOSIS — R60.9 EDEMA, UNSPECIFIED TYPE: ICD-10-CM

## 2020-02-25 DIAGNOSIS — R53.1 WEAKNESS: Primary | ICD-10-CM

## 2020-05-12 ENCOUNTER — TELEMEDICINE (OUTPATIENT)
Dept: FAMILY MEDICINE CLINIC | Facility: CLINIC | Age: 65
End: 2020-05-12

## 2020-05-12 ENCOUNTER — LAB (OUTPATIENT)
Dept: LAB | Facility: OTHER | Age: 65
End: 2020-05-12

## 2020-05-12 DIAGNOSIS — R06.02 SHORTNESS OF BREATH: ICD-10-CM

## 2020-05-12 DIAGNOSIS — M79.89 LEG SWELLING: ICD-10-CM

## 2020-05-12 DIAGNOSIS — R60.9 EDEMA, UNSPECIFIED TYPE: Primary | ICD-10-CM

## 2020-05-12 DIAGNOSIS — R60.9 EDEMA, UNSPECIFIED TYPE: ICD-10-CM

## 2020-05-12 LAB
ALBUMIN SERPL-MCNC: 3.6 G/DL (ref 3.5–5)
ALBUMIN/GLOB SERPL: 1.4 G/DL (ref 1.1–1.8)
ALP SERPL-CCNC: 84 U/L (ref 38–126)
ALT SERPL W P-5'-P-CCNC: 27 U/L
ANION GAP SERPL CALCULATED.3IONS-SCNC: 6 MMOL/L (ref 5–15)
ANISOCYTOSIS BLD QL: ABNORMAL
AST SERPL-CCNC: 27 U/L (ref 14–36)
BILIRUB SERPL-MCNC: 0.2 MG/DL (ref 0.2–1.3)
BNP SERPL-MCNC: 21.8 PG/ML (ref 0–100)
BUN BLD-MCNC: 35 MG/DL (ref 7–23)
BUN/CREAT SERPL: 34.3 (ref 7–25)
CALCIUM SPEC-SCNC: 8.4 MG/DL (ref 8.4–10.2)
CHLORIDE SERPL-SCNC: 101 MMOL/L (ref 101–112)
CO2 SERPL-SCNC: 33 MMOL/L (ref 22–30)
CREAT BLD-MCNC: 1.02 MG/DL (ref 0.52–1.04)
DEPRECATED RDW RBC AUTO: 68.3 FL (ref 37–54)
ERYTHROCYTE [DISTWIDTH] IN BLOOD BY AUTOMATED COUNT: 20.1 % (ref 12.3–15.4)
GFR SERPL CREATININE-BSD FRML MDRD: 55 ML/MIN/1.73 (ref 45–104)
GLOBULIN UR ELPH-MCNC: 2.6 GM/DL (ref 2.3–3.5)
GLUCOSE BLD-MCNC: 123 MG/DL (ref 70–99)
HCT VFR BLD AUTO: 34.1 % (ref 34–46.6)
HGB BLD-MCNC: 10 G/DL (ref 12–15.9)
HYPOCHROMIA BLD QL: ABNORMAL
LYMPHOCYTES # BLD MANUAL: 1.34 10*3/MM3 (ref 0.7–3.1)
LYMPHOCYTES NFR BLD MANUAL: 12 % (ref 19.6–45.3)
LYMPHOCYTES NFR BLD MANUAL: 7 % (ref 5–12)
MCH RBC QN AUTO: 28.4 PG (ref 26.6–33)
MCHC RBC AUTO-ENTMCNC: 29.3 G/DL (ref 31.5–35.7)
MCV RBC AUTO: 96.9 FL (ref 79–97)
MONOCYTES # BLD AUTO: 0.78 10*3/MM3 (ref 0.1–0.9)
NEUTROPHILS # BLD AUTO: 8.9 10*3/MM3 (ref 1.7–7)
NEUTROPHILS NFR BLD MANUAL: 80 % (ref 42.7–76)
PLATELET # BLD AUTO: 215 10*3/MM3 (ref 140–450)
PMV BLD AUTO: 9.8 FL (ref 6–12)
POTASSIUM BLD-SCNC: 4.2 MMOL/L (ref 3.4–5)
PROT SERPL-MCNC: 6.2 G/DL (ref 6.3–8.6)
RBC # BLD AUTO: 3.52 10*6/MM3 (ref 3.77–5.28)
SMALL PLATELETS BLD QL SMEAR: ADEQUATE
SODIUM BLD-SCNC: 140 MMOL/L (ref 137–145)
VARIANT LYMPHS NFR BLD MANUAL: 1 % (ref 0–5)
WBC MORPH BLD: NORMAL
WBC NRBC COR # BLD: 11.13 10*3/MM3 (ref 3.4–10.8)

## 2020-05-12 PROCEDURE — 36415 COLL VENOUS BLD VENIPUNCTURE: CPT | Performed by: NURSE PRACTITIONER

## 2020-05-12 PROCEDURE — 80053 COMPREHEN METABOLIC PANEL: CPT | Performed by: NURSE PRACTITIONER

## 2020-05-12 PROCEDURE — 83880 ASSAY OF NATRIURETIC PEPTIDE: CPT | Performed by: NURSE PRACTITIONER

## 2020-05-12 PROCEDURE — 99213 OFFICE O/P EST LOW 20 MIN: CPT | Performed by: NURSE PRACTITIONER

## 2020-05-12 PROCEDURE — 85025 COMPLETE CBC W/AUTO DIFF WBC: CPT | Performed by: NURSE PRACTITIONER

## 2020-05-12 RX ORDER — FUROSEMIDE 40 MG/1
40 TABLET ORAL DAILY
Qty: 30 TABLET | Refills: 0 | Status: SHIPPED | OUTPATIENT
Start: 2020-05-12 | End: 2020-05-12

## 2020-05-12 RX ORDER — FUROSEMIDE 40 MG/1
40 TABLET ORAL DAILY
Qty: 90 TABLET | Refills: 3 | Status: SHIPPED | OUTPATIENT
Start: 2020-05-12 | End: 2021-05-10

## 2020-05-12 RX ORDER — FLUOXETINE HYDROCHLORIDE 20 MG/1
20 CAPSULE ORAL DAILY
COMMUNITY

## 2020-05-12 NOTE — PROGRESS NOTES
"Subjective   Amanda Pittman is a 64 y.o. female. Patient here today with complaints of Leg Swelling  pt contacted office today for video visit with , complaining of BLE swelling, she reports she has been on her feet cooking more than usual however denies increased sodium intake. Does have some shortness of breath on exertion she states. Symptoms present for \" a while\" but worse in the last week. She is on lasix 20mg daily. 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 Is still taking bactrim, prednisone. Denies hx of CHF. Reports is taking prozac 20mg prescribed by a NP relative, is taking multi vitamin and calcium with vit d as well.      There were no vitals filed for this visit.  There is no height or weight on file to calculate BMI.  Past Medical History:   Diagnosis Date   • Acute bronchitis    • Cellulitis     and abscess of face   • Dysphasia    • Herpes zoster    • Memory impairment      Leg Swelling   This is a recurrent problem. The current episode started more than 1 month ago. The problem occurs constantly. The problem has been gradually worsening. The symptoms are aggravated by standing (prolonged sitting ). She has tried rest, sleep and relaxation (SVETA wearing qod, elevation of BLE and ankle pumps BLE ) for the symptoms. The treatment provided mild relief.        The following portions of the patient's history were reviewed and updated as appropriate: allergies, current medications, past family history, past medical history, past social history, past surgical history and problem list.    Review of Systems   Constitutional: Negative.    HENT: Negative.    Eyes: Negative.    Respiratory: Positive for shortness of breath (sl SOB with exertion per pt ).    Cardiovascular: Negative.    Gastrointestinal: Negative.    Endocrine: Negative.    Genitourinary: Negative.    Musculoskeletal: " Positive for gait problem.   Skin: Negative.    Allergic/Immunologic: Negative.    Hematological: Negative.    Psychiatric/Behavioral: Negative.        Objective   Physical Exam   Constitutional: She appears well-developed and well-nourished. No distress.   HENT:   Head: Normocephalic and atraumatic.   Right Ear: Hearing and external ear normal.   Left Ear: Hearing and external ear normal.   Eyes: Conjunctivae and EOM are normal.   Pulmonary/Chest: Effort normal. No respiratory distress.   Musculoskeletal: Normal range of motion. She exhibits edema (2+ pitting edema noted BLE from ankles to right below knees bilat, skin without erythema or changes, denies warmth to touch as well, denies leg pain ).   Neurological: She is alert. No cranial nerve deficit. Coordination normal.   Skin: No rash noted. She is not diaphoretic. No erythema. No pallor.   Psychiatric: She has a normal mood and affect.     Physical Exam   Constitutional: She appears well-developed and well-nourished. No distress.   HENT:   Head: Normocephalic and atraumatic.   Right Ear: Hearing and external ear normal.   Left Ear: Hearing and external ear normal.   Eyes: Conjunctivae and EOM are normal.   Neck: Neck normal appearance.  Pulmonary/Chest: Effort normal.  No respiratory distress.  Musculoskeletal: Normal range of motion.         General: Edema (2+ pitting edema noted BLE from ankles to right below knees bilat, skin without erythema or changes, denies warmth to touch as well, denies leg pain ) present.   Neurological: She is alert. No cranial nerve deficit. Coordination normal.   Skin: No rash noted. She is not diaphoretic. No erythema. No pallor.   Psychiatric: She has a normal mood and affect. She mood appears normal. Her affect is normal. Her behavior is normal. Thought content is normal. She does not express abnormal judgement.       Assessment/Plan   Amanda was seen today for leg swelling.    Diagnoses and all orders for this visit:    Edema,  unspecified type  -     BNP; Future  -     CBC & Differential; Future  -     Comprehensive metabolic panel; Future  -     XR Chest 2 View    Leg swelling  -     BNP; Future  -     CBC & Differential; Future  -     Comprehensive metabolic panel; Future  -     XR Chest 2 View    Shortness of breath  -     BNP; Future  -     CBC & Differential; Future  -     Comprehensive metabolic panel; Future  -     XR Chest 2 View    Other orders  -     furosemide (Lasix) 40 MG tablet; Take 1 tablet by mouth Daily.    she will have labs as above and will be informed of results via phone, will follow up in 1 week for recheck or sooner if nec. Will increase lasix to 40mg qd and advised she wear SVETA stockings as often as she can . She is wearing thigh high SVETA now. Advised she continue with ankle pumps and elevation of lower ext as well. They are aware and are in agreement to this plan. All questions and concerns are addressed with understanding noted. You have chosen to receive care through a telehealth visit.  Do you consent to use a video/audio connection for your medical care today? Yes

## 2020-05-19 ENCOUNTER — LAB (OUTPATIENT)
Dept: LAB | Facility: OTHER | Age: 65
End: 2020-05-19

## 2020-05-19 ENCOUNTER — TELEMEDICINE (OUTPATIENT)
Dept: FAMILY MEDICINE CLINIC | Facility: CLINIC | Age: 65
End: 2020-05-19

## 2020-05-19 VITALS — WEIGHT: 158 LBS | BODY MASS INDEX: 30.86 KG/M2

## 2020-05-19 DIAGNOSIS — D64.9 LOW HEMOGLOBIN: ICD-10-CM

## 2020-05-19 DIAGNOSIS — D72.829 LEUKOCYTOSIS, UNSPECIFIED TYPE: ICD-10-CM

## 2020-05-19 DIAGNOSIS — R60.9 EDEMA, UNSPECIFIED TYPE: Primary | ICD-10-CM

## 2020-05-19 DIAGNOSIS — R60.9 EDEMA, UNSPECIFIED TYPE: ICD-10-CM

## 2020-05-19 DIAGNOSIS — R79.9 ELEVATED BUN: ICD-10-CM

## 2020-05-19 DIAGNOSIS — M79.89 LEG SWELLING: ICD-10-CM

## 2020-05-19 LAB
ALBUMIN SERPL-MCNC: 3.8 G/DL (ref 3.5–5)
ALBUMIN/GLOB SERPL: 1.4 G/DL (ref 1.1–1.8)
ALP SERPL-CCNC: 99 U/L (ref 38–126)
ALT SERPL W P-5'-P-CCNC: 45 U/L
ANION GAP SERPL CALCULATED.3IONS-SCNC: 8 MMOL/L (ref 5–15)
ANISOCYTOSIS BLD QL: ABNORMAL
AST SERPL-CCNC: 25 U/L (ref 14–36)
BILIRUB SERPL-MCNC: 0.4 MG/DL (ref 0.2–1.3)
BUN BLD-MCNC: 38 MG/DL (ref 7–23)
BUN/CREAT SERPL: 32.5 (ref 7–25)
CALCIUM SPEC-SCNC: 9.5 MG/DL (ref 8.4–10.2)
CHLORIDE SERPL-SCNC: 94 MMOL/L (ref 101–112)
CO2 SERPL-SCNC: 36 MMOL/L (ref 22–30)
CREAT BLD-MCNC: 1.17 MG/DL (ref 0.52–1.04)
DEPRECATED RDW RBC AUTO: 66.9 FL (ref 37–54)
ERYTHROCYTE [DISTWIDTH] IN BLOOD BY AUTOMATED COUNT: 19.7 % (ref 12.3–15.4)
GFR SERPL CREATININE-BSD FRML MDRD: 47 ML/MIN/1.73 (ref 45–104)
GLOBULIN UR ELPH-MCNC: 2.8 GM/DL (ref 2.3–3.5)
GLUCOSE BLD-MCNC: 94 MG/DL (ref 70–99)
HCT VFR BLD AUTO: 35 % (ref 34–46.6)
HGB BLD-MCNC: 10.5 G/DL (ref 12–15.9)
HYPOCHROMIA BLD QL: ABNORMAL
LYMPHOCYTES # BLD MANUAL: 1.55 10*3/MM3 (ref 0.7–3.1)
LYMPHOCYTES NFR BLD MANUAL: 15 % (ref 19.6–45.3)
LYMPHOCYTES NFR BLD MANUAL: 5 % (ref 5–12)
MCH RBC QN AUTO: 28.9 PG (ref 26.6–33)
MCHC RBC AUTO-ENTMCNC: 30 G/DL (ref 31.5–35.7)
MCV RBC AUTO: 96.4 FL (ref 79–97)
MONOCYTES # BLD AUTO: 0.52 10*3/MM3 (ref 0.1–0.9)
NEUTROPHILS # BLD AUTO: 8.28 10*3/MM3 (ref 1.7–7)
NEUTROPHILS NFR BLD MANUAL: 80 % (ref 42.7–76)
PLATELET # BLD AUTO: 214 10*3/MM3 (ref 140–450)
PMV BLD AUTO: 9.4 FL (ref 6–12)
POTASSIUM BLD-SCNC: 4 MMOL/L (ref 3.4–5)
PROT SERPL-MCNC: 6.6 G/DL (ref 6.3–8.6)
RBC # BLD AUTO: 3.63 10*6/MM3 (ref 3.77–5.28)
SMALL PLATELETS BLD QL SMEAR: ADEQUATE
SODIUM BLD-SCNC: 138 MMOL/L (ref 137–145)
WBC MORPH BLD: NORMAL
WBC NRBC COR # BLD: 10.35 10*3/MM3 (ref 3.4–10.8)

## 2020-05-19 PROCEDURE — 99213 OFFICE O/P EST LOW 20 MIN: CPT | Performed by: NURSE PRACTITIONER

## 2020-05-19 PROCEDURE — 80053 COMPREHEN METABOLIC PANEL: CPT | Performed by: NURSE PRACTITIONER

## 2020-05-19 PROCEDURE — 36415 COLL VENOUS BLD VENIPUNCTURE: CPT | Performed by: NURSE PRACTITIONER

## 2020-05-19 PROCEDURE — 85025 COMPLETE CBC W/AUTO DIFF WBC: CPT | Performed by: NURSE PRACTITIONER

## 2020-05-19 NOTE — PROGRESS NOTES
Subjective   Amanda Pittman is a 64 y.o. female. Patient here today with complaints of Leg Swelling  pt contacted the office today for recheck of edema BLE , shortness of breath, per telehealth visit. She reports weight down from 162 to 158 and weight has been steady over the last several days. She reports no longer having shortness of breath, denies chest pain also. She states when checked BP running on average 120/80. She does have follow up scheduled with [facility] in June 202. Continues on lasix 40mg qd and states is now wearing SVETA hose more than she had been since she is able to put these on easier now with her legs less swollen.      Vitals:    05/19/20 0803   Weight: 71.7 kg (158 lb)     Body mass index is 30.86 kg/m².  Past Medical History:   Diagnosis Date   • Acute bronchitis    • Cellulitis     and abscess of face   • Dysphasia    • Herpes zoster    • Memory impairment      Leg Swelling   This is a recurrent problem. The current episode started 1 to 4 weeks ago. The problem occurs constantly. The problem has been rapidly improving. Pertinent negatives include no chest pain. The symptoms are aggravated by standing (prolonged sitting). She has tried rest, sleep, relaxation and position changes (SVETA hose, elevation of BLE, lasix) for the symptoms. The treatment provided significant relief.        The following portions of the patient's history were reviewed and updated as appropriate: allergies, current medications, past family history, past medical history, past social history, past surgical history and problem list.    Review of Systems   Constitutional: Negative.    HENT: Negative.    Eyes: Negative.    Respiratory: Negative.    Cardiovascular: Positive for leg swelling. Negative for chest pain.   Gastrointestinal: Negative.    Endocrine: Negative.    Genitourinary: Negative.    Musculoskeletal: Negative.    Skin: Negative.    Allergic/Immunologic: Negative.    Neurological: Negative.     Hematological: Negative.    Psychiatric/Behavioral: Negative.        Objective   Physical Exam   Constitutional: She appears well-developed and well-nourished. No distress.   HENT:   Head: Normocephalic and atraumatic.   Right Ear: External ear normal.   Left Ear: External ear normal.   Eyes: Conjunctivae and EOM are normal.   Pulmonary/Chest: Effort normal. No respiratory distress.   Musculoskeletal: Normal range of motion. She exhibits edema.   Appears to have trace pitting edema noted to BLE which is a considerable improvement since last visit here, BLE without redness and pt denies warmth as well   Neurological: She is alert. No cranial nerve deficit.   Skin: No rash noted. She is not diaphoretic. No erythema. No pallor.   Psychiatric: She has a normal mood and affect.     Physical Exam   Constitutional: She appears well-developed and well-nourished. No distress.   HENT:   Head: Normocephalic and atraumatic.   Right Ear: External ear normal.   Left Ear: External ear normal.   Eyes: Conjunctivae and EOM are normal.   Neck: Neck normal appearance.  Pulmonary/Chest: Effort normal.  No respiratory distress.  Musculoskeletal: Normal range of motion.         General: Edema present.   Appears to have trace pitting edema noted to BLE which is a considerable improvement since last visit here, BLE without redness and pt denies warmth as well   Neurological: She is alert. No cranial nerve deficit.   Skin: No rash noted. She is not diaphoretic. No erythema. No pallor.   Psychiatric: She has a normal mood and affect.       Assessment/Plan   Amanda was seen today for leg swelling.    Diagnoses and all orders for this visit:    Edema, unspecified type  -     CBC & Differential; Future  -     Comprehensive metabolic panel; Future    Leg swelling  -     CBC & Differential; Future  -     Comprehensive metabolic panel; Future    Elevated BUN  -     Comprehensive metabolic panel; Future    Leukocytosis, unspecified type  -     CBC  & Differential; Future    Low hemoglobin  -     CBC & Differential; Future    Labs are reviewed at today's visit  Will repeat cmp and cbc and at this point continue her on lasix 40mg qd however depending on bun results may need to decrease lasix dose or stop completely  She is made aware of this  Advised to continue monitoring her weight, BP closely and follow up with [facility] as scheduled in June 2020. If symptoms persist or worsen prior to that time however she is asked to follow up here. She is aware and is in agreement to this plan. All questions and concerns are addressed with understanding noted. You have chosen to receive care through a telehealth visit.  Do you consent to use a video/audio connection for your medical care today? yes

## 2020-06-29 DIAGNOSIS — D86.81: Primary | ICD-10-CM

## 2020-06-29 DIAGNOSIS — H47.10 PAPILLEDEMA: ICD-10-CM

## 2020-08-31 ENCOUNTER — TRANSCRIBE ORDERS (OUTPATIENT)
Dept: ADMINISTRATIVE | Facility: HOSPITAL | Age: 65
End: 2020-08-31

## 2020-08-31 ENCOUNTER — OFFICE VISIT (OUTPATIENT)
Dept: WOUND CARE | Facility: HOSPITAL | Age: 65
End: 2020-08-31

## 2020-08-31 DIAGNOSIS — R60.0 LOCALIZED EDEMA: Primary | ICD-10-CM

## 2020-08-31 DIAGNOSIS — R09.89 WEAK PULSE: ICD-10-CM

## 2020-08-31 PROCEDURE — G0463 HOSPITAL OUTPT CLINIC VISIT: HCPCS

## 2020-09-09 ENCOUNTER — OFFICE VISIT (OUTPATIENT)
Dept: WOUND CARE | Facility: HOSPITAL | Age: 65
End: 2020-09-09

## 2020-09-09 PROCEDURE — G0463 HOSPITAL OUTPT CLINIC VISIT: HCPCS

## 2020-09-16 ENCOUNTER — OFFICE VISIT (OUTPATIENT)
Dept: WOUND CARE | Facility: HOSPITAL | Age: 65
End: 2020-09-16

## 2020-09-23 ENCOUNTER — HOSPITAL ENCOUNTER (OUTPATIENT)
Dept: MRI IMAGING | Facility: HOSPITAL | Age: 65
Discharge: HOME | End: 2020-09-23
Admitting: NURSE PRACTITIONER

## 2020-09-23 ENCOUNTER — OFFICE VISIT (OUTPATIENT)
Dept: WOUND CARE | Facility: HOSPITAL | Age: 65
End: 2020-09-23

## 2020-09-23 DIAGNOSIS — D86.81: ICD-10-CM

## 2020-09-23 PROCEDURE — 70553 MRI BRAIN STEM W/O & W/DYE: CPT

## 2020-09-23 PROCEDURE — 25010000002 GADOTERIDOL PER 1 ML: Performed by: NURSE PRACTITIONER

## 2020-09-23 PROCEDURE — A9579 GAD-BASE MR CONTRAST NOS,1ML: HCPCS | Performed by: NURSE PRACTITIONER

## 2020-09-23 RX ADMIN — GADOTERIDOL 18 ML: 279.3 INJECTION, SOLUTION INTRAVENOUS at 07:39

## 2020-09-28 ENCOUNTER — TRANSCRIBE ORDERS (OUTPATIENT)
Dept: WOUND CARE | Facility: HOSPITAL | Age: 65
End: 2020-09-28

## 2020-09-28 DIAGNOSIS — R60.0 LOCAL EDEMA: Primary | ICD-10-CM

## 2020-09-30 ENCOUNTER — OFFICE VISIT (OUTPATIENT)
Dept: WOUND CARE | Facility: HOSPITAL | Age: 65
End: 2020-09-30

## 2020-09-30 PROCEDURE — G0463 HOSPITAL OUTPT CLINIC VISIT: HCPCS

## 2020-10-05 ENCOUNTER — OFFICE VISIT (OUTPATIENT)
Dept: WOUND CARE | Facility: HOSPITAL | Age: 65
End: 2020-10-05

## 2020-10-14 ENCOUNTER — APPOINTMENT (OUTPATIENT)
Dept: WOUND CARE | Facility: HOSPITAL | Age: 65
End: 2020-10-14

## 2020-10-21 ENCOUNTER — OFFICE VISIT (OUTPATIENT)
Dept: WOUND CARE | Facility: HOSPITAL | Age: 65
End: 2020-10-21

## 2020-10-28 ENCOUNTER — OFFICE VISIT (OUTPATIENT)
Dept: WOUND CARE | Facility: HOSPITAL | Age: 65
End: 2020-10-28

## 2020-11-04 ENCOUNTER — APPOINTMENT (OUTPATIENT)
Dept: WOUND CARE | Facility: HOSPITAL | Age: 65
End: 2020-11-04

## 2020-11-04 ENCOUNTER — OFFICE VISIT (OUTPATIENT)
Dept: WOUND CARE | Facility: HOSPITAL | Age: 65
End: 2020-11-04

## 2020-11-04 PROCEDURE — G0463 HOSPITAL OUTPT CLINIC VISIT: HCPCS

## 2020-11-11 ENCOUNTER — HOSPITAL ENCOUNTER (OUTPATIENT)
Dept: ULTRASOUND IMAGING | Facility: HOSPITAL | Age: 65
Discharge: HOME | End: 2020-11-11

## 2020-11-11 ENCOUNTER — OFFICE VISIT (OUTPATIENT)
Dept: WOUND CARE | Facility: HOSPITAL | Age: 65
End: 2020-11-11

## 2020-11-11 DIAGNOSIS — R60.0 LOCALIZED EDEMA: ICD-10-CM

## 2020-11-11 PROCEDURE — 93970 EXTREMITY STUDY: CPT

## 2020-11-18 ENCOUNTER — APPOINTMENT (OUTPATIENT)
Dept: WOUND CARE | Facility: HOSPITAL | Age: 65
End: 2020-11-18

## 2020-11-25 ENCOUNTER — OFFICE VISIT (OUTPATIENT)
Dept: WOUND CARE | Facility: HOSPITAL | Age: 65
End: 2020-11-25

## 2020-12-02 ENCOUNTER — OFFICE VISIT (OUTPATIENT)
Dept: WOUND CARE | Facility: HOSPITAL | Age: 65
End: 2020-12-02

## 2020-12-09 ENCOUNTER — OFFICE VISIT (OUTPATIENT)
Dept: WOUND CARE | Facility: HOSPITAL | Age: 65
End: 2020-12-09

## 2020-12-16 ENCOUNTER — OFFICE VISIT (OUTPATIENT)
Dept: WOUND CARE | Facility: HOSPITAL | Age: 65
End: 2020-12-16

## 2020-12-16 PROCEDURE — G0463 HOSPITAL OUTPT CLINIC VISIT: HCPCS

## 2021-03-23 RX ORDER — FUROSEMIDE 40 MG/1
40 TABLET ORAL DAILY
Qty: 90 TABLET | Refills: 3 | OUTPATIENT
Start: 2021-03-23

## 2021-05-10 RX ORDER — FUROSEMIDE 40 MG/1
40 TABLET ORAL DAILY
Qty: 90 TABLET | Refills: 0 | Status: SHIPPED | OUTPATIENT
Start: 2021-05-10
